# Patient Record
Sex: FEMALE | Race: WHITE | NOT HISPANIC OR LATINO | Employment: OTHER | URBAN - METROPOLITAN AREA
[De-identification: names, ages, dates, MRNs, and addresses within clinical notes are randomized per-mention and may not be internally consistent; named-entity substitution may affect disease eponyms.]

---

## 2023-08-31 PROBLEM — F33.1 MAJOR DEPRESSIVE DISORDER, RECURRENT EPISODE, MODERATE WITH ANXIOUS DISTRESS (MULTI): Status: ACTIVE | Noted: 2023-08-31

## 2023-08-31 PROBLEM — Z95.1 S/P CABG (CORONARY ARTERY BYPASS GRAFT): Status: ACTIVE | Noted: 2023-08-31

## 2023-08-31 PROBLEM — R09.89 CARDIOVASCULAR SYMPTOMS: Status: ACTIVE | Noted: 2023-08-31

## 2023-08-31 PROBLEM — L50.9 URTICARIA: Status: ACTIVE | Noted: 2023-08-31

## 2023-08-31 PROBLEM — N39.41 URGE INCONTINENCE OF URINE: Status: ACTIVE | Noted: 2023-08-31

## 2023-08-31 PROBLEM — R07.9 CHEST PAIN: Status: ACTIVE | Noted: 2023-08-31

## 2023-08-31 PROBLEM — K44.9 LARGE HIATAL HERNIA: Status: ACTIVE | Noted: 2023-08-31

## 2023-08-31 PROBLEM — I20.9 ANGINA PECTORIS (CMS-HCC): Status: ACTIVE | Noted: 2023-08-31

## 2023-08-31 PROBLEM — J45.991 COUGH VARIANT ASTHMA (HHS-HCC): Status: ACTIVE | Noted: 2023-08-31

## 2023-08-31 PROBLEM — N32.81 OVERACTIVE BLADDER: Status: ACTIVE | Noted: 2023-08-31

## 2023-08-31 PROBLEM — D22.4 MELANOCYTIC NEVI OF SCALP AND NECK: Status: ACTIVE | Noted: 2023-03-22

## 2023-08-31 PROBLEM — F41.8 DEPRESSION WITH ANXIETY: Status: ACTIVE | Noted: 2023-08-31

## 2023-08-31 PROBLEM — E78.5 HYPERLIPIDEMIA: Status: ACTIVE | Noted: 2023-08-31

## 2023-08-31 PROBLEM — R06.00 DYSPNEA: Status: ACTIVE | Noted: 2023-08-31

## 2023-08-31 PROBLEM — E66.811 CLASS 1 OBESITY: Status: ACTIVE | Noted: 2023-08-31

## 2023-08-31 PROBLEM — L90.5 SCAR CONDITION AND FIBROSIS OF SKIN: Status: ACTIVE | Noted: 2023-03-22

## 2023-08-31 PROBLEM — I73.9 CLAUDICATION OF CALF MUSCLES (CMS-HCC): Status: ACTIVE | Noted: 2023-08-31

## 2023-08-31 PROBLEM — M15.4 EROSIVE (OSTEO)ARTHRITIS: Status: ACTIVE | Noted: 2023-08-31

## 2023-08-31 PROBLEM — M54.16 LUMBAR RADICULITIS: Status: ACTIVE | Noted: 2023-08-31

## 2023-08-31 PROBLEM — H91.8X3 ASYMMETRICAL HEARING LOSS: Status: ACTIVE | Noted: 2023-08-31

## 2023-08-31 PROBLEM — M85.80 OSTEOPENIA: Status: ACTIVE | Noted: 2023-08-31

## 2023-08-31 PROBLEM — D48.5 NEOPLASM OF UNCERTAIN BEHAVIOR OF SKIN: Status: ACTIVE | Noted: 2023-03-22

## 2023-08-31 PROBLEM — D51.0 ANEMIA, PERNICIOUS: Status: ACTIVE | Noted: 2023-08-31

## 2023-08-31 PROBLEM — I25.10 CORONARY ARTERY DISEASE: Status: ACTIVE | Noted: 2023-08-31

## 2023-08-31 PROBLEM — R41.3 MEMORY DIFFICULTIES: Status: ACTIVE | Noted: 2023-08-31

## 2023-08-31 PROBLEM — M54.50 CHRONIC LOW BACK PAIN: Status: ACTIVE | Noted: 2023-08-31

## 2023-08-31 PROBLEM — Z95.1 S/P CABG (CORONARY ARTERY BYPASS GRAFT): Status: RESOLVED | Noted: 2023-08-31 | Resolved: 2023-08-31

## 2023-08-31 PROBLEM — I73.9 PAD (PERIPHERAL ARTERY DISEASE) (CMS-HCC): Status: ACTIVE | Noted: 2023-08-31

## 2023-08-31 PROBLEM — H93.13 BILATERAL TINNITUS: Status: ACTIVE | Noted: 2023-08-31

## 2023-08-31 PROBLEM — E66.9 CLASS 1 OBESITY: Status: ACTIVE | Noted: 2023-08-31

## 2023-08-31 PROBLEM — R73.01 IFG (IMPAIRED FASTING GLUCOSE): Status: ACTIVE | Noted: 2023-08-31

## 2023-08-31 PROBLEM — E55.9 VITAMIN D DEFICIENCY: Status: ACTIVE | Noted: 2023-08-31

## 2023-08-31 PROBLEM — G25.81 RESTLESS LEG SYNDROME: Status: ACTIVE | Noted: 2023-08-31

## 2023-08-31 PROBLEM — E04.2 MULTINODULAR THYROID: Status: ACTIVE | Noted: 2023-08-31

## 2023-08-31 PROBLEM — G47.33 OBSTRUCTIVE SLEEP APNEA SYNDROME: Status: ACTIVE | Noted: 2023-08-31

## 2023-08-31 PROBLEM — R79.89 D-DIMER, ELEVATED: Status: ACTIVE | Noted: 2023-08-31

## 2023-08-31 PROBLEM — M16.12 OSTEOARTHRITIS OF LEFT HIP: Status: ACTIVE | Noted: 2023-08-31

## 2023-08-31 PROBLEM — R53.81 PHYSICAL DECONDITIONING: Status: ACTIVE | Noted: 2023-08-31

## 2023-08-31 PROBLEM — J31.0 CHRONIC RHINITIS: Status: ACTIVE | Noted: 2023-08-31

## 2023-08-31 PROBLEM — R12 HEARTBURN: Status: ACTIVE | Noted: 2023-08-31

## 2023-08-31 PROBLEM — R53.83 FATIGUE DUE TO DEPRESSION: Status: ACTIVE | Noted: 2023-08-31

## 2023-08-31 PROBLEM — S82.409A FIBULA FRACTURE: Status: ACTIVE | Noted: 2023-08-31

## 2023-08-31 PROBLEM — E53.8 VITAMIN B 12 DEFICIENCY: Status: ACTIVE | Noted: 2023-08-31

## 2023-08-31 PROBLEM — F32.A FATIGUE DUE TO DEPRESSION: Status: ACTIVE | Noted: 2023-08-31

## 2023-08-31 PROBLEM — G47.00 INSOMNIA: Status: ACTIVE | Noted: 2023-08-31

## 2023-08-31 PROBLEM — N18.9 CKD (CHRONIC KIDNEY DISEASE): Status: ACTIVE | Noted: 2023-08-31

## 2023-08-31 PROBLEM — F41.9 ANXIETY: Status: ACTIVE | Noted: 2023-08-31

## 2023-08-31 PROBLEM — I51.89 DIASTOLIC DYSFUNCTION: Status: ACTIVE | Noted: 2023-08-31

## 2023-08-31 PROBLEM — K21.9 GERD (GASTROESOPHAGEAL REFLUX DISEASE): Status: ACTIVE | Noted: 2023-08-31

## 2023-08-31 PROBLEM — D18.01 HEMANGIOMA OF SKIN AND SUBCUTANEOUS TISSUE: Status: ACTIVE | Noted: 2023-03-22

## 2023-08-31 PROBLEM — R94.2 DIFFUSION CAPACITY OF LUNG (DL), DECREASED: Status: ACTIVE | Noted: 2023-08-31

## 2023-08-31 PROBLEM — G89.29 CHRONIC LOW BACK PAIN: Status: ACTIVE | Noted: 2023-08-31

## 2023-08-31 PROBLEM — E04.1 RIGHT THYROID NODULE: Status: ACTIVE | Noted: 2023-08-31

## 2023-08-31 PROBLEM — R39.15 URINARY URGENCY: Status: ACTIVE | Noted: 2023-08-31

## 2023-08-31 PROBLEM — L57.0 ACTINIC KERATOSIS: Status: ACTIVE | Noted: 2023-03-22

## 2023-08-31 PROBLEM — I10 HYPERTENSION: Status: ACTIVE | Noted: 2023-08-31

## 2023-08-31 PROBLEM — R00.2 HEART PALPITATIONS: Status: ACTIVE | Noted: 2023-08-31

## 2023-08-31 PROBLEM — J45.909 ASTHMA (HHS-HCC): Status: ACTIVE | Noted: 2023-08-31

## 2023-08-31 RX ORDER — NITROGLYCERIN 0.4 MG/1
0.4 TABLET SUBLINGUAL EVERY 5 MIN PRN
COMMUNITY
Start: 2020-08-17

## 2023-08-31 RX ORDER — ROSUVASTATIN CALCIUM 10 MG/1
1 TABLET, COATED ORAL DAILY
COMMUNITY
Start: 2021-03-31

## 2023-08-31 RX ORDER — FLUTICASONE FUROATE 100 UG/1
1 POWDER RESPIRATORY (INHALATION) DAILY
COMMUNITY
Start: 2023-02-08 | End: 2023-10-09 | Stop reason: ALTCHOICE

## 2023-08-31 RX ORDER — CHOLECALCIFEROL (VITAMIN D3) 125 MCG
125 CAPSULE ORAL
COMMUNITY
Start: 2022-05-24 | End: 2023-10-31

## 2023-08-31 RX ORDER — SOLIFENACIN SUCCINATE 10 MG/1
1 TABLET, FILM COATED ORAL DAILY
COMMUNITY
Start: 2022-03-09 | End: 2024-05-17 | Stop reason: SDUPTHER

## 2023-08-31 RX ORDER — MV/FA/DHA/EPA/FISH OIL/SAW/GNK 400MCG-200
COMBINATION PACKAGE (EA) ORAL
COMMUNITY
Start: 2022-11-22 | End: 2023-12-20 | Stop reason: WASHOUT

## 2023-08-31 RX ORDER — DEXAMETHASONE 4 MG/1
2 TABLET ORAL 2 TIMES DAILY
COMMUNITY
Start: 2023-04-19

## 2023-08-31 RX ORDER — KETOCONAZOLE 20 MG/G
CREAM TOPICAL
COMMUNITY
Start: 2022-01-26

## 2023-08-31 RX ORDER — ALBUTEROL SULFATE 90 UG/1
2 AEROSOL, METERED RESPIRATORY (INHALATION) EVERY 4 HOURS PRN
COMMUNITY
Start: 2023-02-08 | End: 2024-01-31 | Stop reason: WASHOUT

## 2023-08-31 RX ORDER — SERTRALINE HYDROCHLORIDE 50 MG/1
1 TABLET, FILM COATED ORAL DAILY
COMMUNITY
Start: 2019-07-05 | End: 2023-12-20 | Stop reason: SDUPTHER

## 2023-08-31 RX ORDER — AA/PROT/LYSINE/METHIO/VIT C/B6 50-12.5 MG
10 TABLET ORAL 2 TIMES DAILY
COMMUNITY
Start: 2017-07-05

## 2023-08-31 RX ORDER — DILTIAZEM HYDROCHLORIDE 120 MG/1
120 CAPSULE, EXTENDED RELEASE ORAL DAILY
COMMUNITY
Start: 2020-01-08 | End: 2024-01-22 | Stop reason: SDUPTHER

## 2023-08-31 RX ORDER — SPIRONOLACTONE 25 MG/1
1 TABLET ORAL DAILY
COMMUNITY
Start: 2022-05-24 | End: 2023-12-20 | Stop reason: WASHOUT

## 2023-08-31 RX ORDER — AZELASTINE 1 MG/ML
1 SPRAY, METERED NASAL 2 TIMES DAILY
COMMUNITY
Start: 2023-02-08

## 2023-08-31 RX ORDER — CETIRIZINE HYDROCHLORIDE 10 MG/1
10 TABLET, ORALLY DISINTEGRATING ORAL DAILY PRN
COMMUNITY
Start: 2019-05-17

## 2023-10-09 ENCOUNTER — OFFICE VISIT (OUTPATIENT)
Dept: PULMONOLOGY | Facility: CLINIC | Age: 78
End: 2023-10-09
Payer: MEDICARE

## 2023-10-09 VITALS
SYSTOLIC BLOOD PRESSURE: 131 MMHG | HEART RATE: 76 BPM | RESPIRATION RATE: 18 BRPM | DIASTOLIC BLOOD PRESSURE: 66 MMHG | BODY MASS INDEX: 34.21 KG/M2 | OXYGEN SATURATION: 94 % | HEIGHT: 61 IN | TEMPERATURE: 97.3 F | WEIGHT: 181.2 LBS

## 2023-10-09 DIAGNOSIS — R05.3 CHRONIC COUGH: ICD-10-CM

## 2023-10-09 DIAGNOSIS — J20.9 SUBACUTE BRONCHITIS: ICD-10-CM

## 2023-10-09 DIAGNOSIS — R06.00 DYSPNEA, UNSPECIFIED TYPE: ICD-10-CM

## 2023-10-09 DIAGNOSIS — J31.0 CHRONIC RHINITIS: Primary | ICD-10-CM

## 2023-10-09 PROBLEM — J45.991 COUGH VARIANT ASTHMA (HHS-HCC): Status: RESOLVED | Noted: 2023-08-31 | Resolved: 2023-10-09

## 2023-10-09 PROBLEM — J45.909 ASTHMA (HHS-HCC): Status: RESOLVED | Noted: 2023-08-31 | Resolved: 2023-10-09

## 2023-10-09 PROCEDURE — 3075F SYST BP GE 130 - 139MM HG: CPT | Performed by: STUDENT IN AN ORGANIZED HEALTH CARE EDUCATION/TRAINING PROGRAM

## 2023-10-09 PROCEDURE — 3078F DIAST BP <80 MM HG: CPT | Performed by: STUDENT IN AN ORGANIZED HEALTH CARE EDUCATION/TRAINING PROGRAM

## 2023-10-09 PROCEDURE — 1159F MED LIST DOCD IN RCRD: CPT | Performed by: STUDENT IN AN ORGANIZED HEALTH CARE EDUCATION/TRAINING PROGRAM

## 2023-10-09 PROCEDURE — 99214 OFFICE O/P EST MOD 30 MIN: CPT | Performed by: STUDENT IN AN ORGANIZED HEALTH CARE EDUCATION/TRAINING PROGRAM

## 2023-10-09 PROCEDURE — 1126F AMNT PAIN NOTED NONE PRSNT: CPT | Performed by: STUDENT IN AN ORGANIZED HEALTH CARE EDUCATION/TRAINING PROGRAM

## 2023-10-09 PROCEDURE — 1036F TOBACCO NON-USER: CPT | Performed by: STUDENT IN AN ORGANIZED HEALTH CARE EDUCATION/TRAINING PROGRAM

## 2023-10-09 NOTE — PROGRESS NOTES
Patient is here for a follow up visit    Interval HPI:  Has a lot of phlegm and is coughing  For about 2 months  Felt much better after the abx from her last visit.    Interval labs and radiology:      ROS: (POSITIVE ROS in UPPER CASE)  Constitutional symptoms: No unexplained weight loss, night sweats, fatigue/malaise/lethargy, loss of appetite, fever  Eyes: no visual changes   Ears, nose, mouth, and throat (ENT): No runny nose, nose bleeds,ear pain   Cardiovascular: No chest pain, loss of consciousness, claudication    Respiratory: No cough, sputum, wheeze, hemoptysis    Gastrointestinal: No abdominal pain, nausea/vomiting, diarrhea/constipation    Genitourinary: No dysuria, hematuria   Musculoskeletal: No stiffness, joint swelling   Neurological: No headache, numbness, limb weakness, poor balance    Endocrine: No polydipsia, polyuria   Hematologic: No purpura, petechia, prolonged or excessive bleeding      Vitals reviewed :see details in note  Vitals:    10/09/23 1344   BP: 131/66   Pulse: 76   Resp: 18   Temp: 36.3 °C (97.3 °F)   SpO2: 94%       Physical exam:  Constitutional: General appearance normal  Head: Normocephalic  Neck: neck supple, no lymphadenopathy  ENT: External inspection of ears and nose normal, moist mucus membranes, no thrush  Pulmonary: air entry bilaterally equal, no crackles or rhonchi  Cardiac: Normal S1 S2, no murmurs, rubs, gallops  GI: no abdominal distension, bowel sounds normal  Musculoskeletal: no swollen joints  Extremities: mild EDEMA bilaterally  Neuro: alert and oriented, no focal deficits, normal cognition  Skin: no erythema nodosum, skin lesions/swellings on extremities    Radiology:  --------------  CT Chest: 1/25/22  IMPRESSION:  1.  No evidence of acute pathology.  No evidence of significant  pulmonary disease. There is evidence of previous granulomatous  disease noted.  2. Coronary artery atherosclerosis and postoperative change from  CABG. Mitral annular calcification is  present. Heart size is normal.  3. Small hiatal hernia remains, decreased in size, patient appears to  be status post Nissen fundoplication. The majority of the hernia  currently is perigastric fat.        Assessment and Plan:  --------------------------    77-year-old  female, ex smoker, < 2 years, CAD s/p CABG (2013), ?asthma (h/o allergy), KUSH not on CPAP, here for evaluation of respiratory complains. Patient has been experiencing SOB intermittently over the past 2-3 years. Initially was found to have a large hiatal hernia which was subsequently repaired. However the SOB continued and is getting worse.     Patients symptoms are multifactorial, obesity, deconditioning, diastolic dysfunction and possible unclear ILD (coarse lower lung base crackles on exam). I suspect her untreated KUSH to be driving a major part of her symptoms, especially given her 30 pound wt gain and non specific symptoms.     1) Subacute bronchitis - ?airway hyperactivity, however methacholine is negative.  2) Diastolic dysfunction  3) Obesity  4) Deconditioning  5) Moderate to severe KUSH - chronic fatigue     Work up:  Echo : 12 Aug 21: Normal EF, impaired diastolic dysfunction, moderate mitral calcification, no TR.  11/18/21: PFT: FEV1 103%, %, DLCO 68%  10/13/21: Cardiac stress test: Stable infarcts along the mid to basillar lateral wall.  1/24/22: 6MWD - 357m (100%)  1/24/22: CT scan of the chest: Calcified granulomas, no pulmonary pathology  2/3/23: Methacholine challenge - negative test, but flow volume loops indicate some mild  2/23: PFT: fev1 - 100%, no BD response, %, DLCO 80%     Plan:  Use Flovent 110ugm, 2 puffs twice. Rinse and gargle after each use.  Albuterol hfa, technique demonstrated  azelastine nasal spray 1 sq each nostril twice daily  Steam inhalation and clear nostrils before bedtime  OTC zyrtec,claritin as needed        RTC in 6 months               Follow-up with a pulmonologist in 6 months.  Patient was made aware that I would not be seeing her in follow up, as my outpatient duties at [facility] was coming to an end. She was given recommendations on who She  could see next year.        Rolly Arnold MD  10/9/2023

## 2023-10-31 ENCOUNTER — OFFICE VISIT (OUTPATIENT)
Dept: PRIMARY CARE | Facility: CLINIC | Age: 78
End: 2023-10-31
Payer: MEDICARE

## 2023-10-31 VITALS
HEART RATE: 94 BPM | BODY MASS INDEX: 34.39 KG/M2 | WEIGHT: 182 LBS | DIASTOLIC BLOOD PRESSURE: 66 MMHG | SYSTOLIC BLOOD PRESSURE: 122 MMHG | OXYGEN SATURATION: 94 %

## 2023-10-31 DIAGNOSIS — Z91.81 AT RISK FOR FALLING: ICD-10-CM

## 2023-10-31 DIAGNOSIS — T14.8XXA CONTUSION OF SOFT TISSUE: Primary | ICD-10-CM

## 2023-10-31 DIAGNOSIS — R39.15 URINARY URGENCY: ICD-10-CM

## 2023-10-31 DIAGNOSIS — F33.1 MAJOR DEPRESSIVE DISORDER, RECURRENT EPISODE, MODERATE WITH ANXIOUS DISTRESS (MULTI): ICD-10-CM

## 2023-10-31 DIAGNOSIS — M21.961 DEFORMITY OF BOTH FEET: ICD-10-CM

## 2023-10-31 DIAGNOSIS — I20.9 ANGINA PECTORIS (CMS-HCC): ICD-10-CM

## 2023-10-31 DIAGNOSIS — M21.962 DEFORMITY OF BOTH FEET: ICD-10-CM

## 2023-10-31 DIAGNOSIS — R26.81 GAIT INSTABILITY: ICD-10-CM

## 2023-10-31 PROBLEM — I73.9 PAD (PERIPHERAL ARTERY DISEASE) (CMS-HCC): Status: RESOLVED | Noted: 2023-08-31 | Resolved: 2023-10-31

## 2023-10-31 PROBLEM — I73.9 CLAUDICATION OF CALF MUSCLES (CMS-HCC): Status: RESOLVED | Noted: 2023-08-31 | Resolved: 2023-10-31

## 2023-10-31 PROCEDURE — 99214 OFFICE O/P EST MOD 30 MIN: CPT | Performed by: INTERNAL MEDICINE

## 2023-10-31 PROCEDURE — 1159F MED LIST DOCD IN RCRD: CPT | Performed by: INTERNAL MEDICINE

## 2023-10-31 PROCEDURE — 3074F SYST BP LT 130 MM HG: CPT | Performed by: INTERNAL MEDICINE

## 2023-10-31 PROCEDURE — 1036F TOBACCO NON-USER: CPT | Performed by: INTERNAL MEDICINE

## 2023-10-31 PROCEDURE — 1160F RVW MEDS BY RX/DR IN RCRD: CPT | Performed by: INTERNAL MEDICINE

## 2023-10-31 PROCEDURE — 87186 SC STD MICRODIL/AGAR DIL: CPT

## 2023-10-31 PROCEDURE — 1126F AMNT PAIN NOTED NONE PRSNT: CPT | Performed by: INTERNAL MEDICINE

## 2023-10-31 PROCEDURE — 3078F DIAST BP <80 MM HG: CPT | Performed by: INTERNAL MEDICINE

## 2023-10-31 PROCEDURE — 87086 URINE CULTURE/COLONY COUNT: CPT

## 2023-10-31 RX ORDER — FOLIC ACID/MULTIVIT,IRON,MINER 0.4MG-18MG
800 TABLET ORAL
Qty: 180 TABLET | Refills: 3 | COMMUNITY
Start: 2023-10-31 | End: 2023-10-31

## 2023-10-31 RX ORDER — VIT C/E/ZN/COPPR/LUTEIN/ZEAXAN 250MG-90MG
25 CAPSULE ORAL DAILY
Qty: 30 CAPSULE | Refills: 11 | Status: SHIPPED | OUTPATIENT
Start: 2023-10-31 | End: 2024-10-30

## 2023-10-31 ASSESSMENT — ENCOUNTER SYMPTOMS
LOSS OF SENSATION IN FEET: 0
DEPRESSION: 0
OCCASIONAL FEELINGS OF UNSTEADINESS: 0

## 2023-10-31 NOTE — PATIENT INSTRUCTIONS

## 2023-10-31 NOTE — PROGRESS NOTES
"Subjective   Patient ID: Angie Schroeder is a 77 y.o. female who presents for Mass (Patient here for bump on buttocks and possibly uti).    HPI   C/O \"bump\" on left buttock area, developed after fall on 9/5, lost balance and landed on the buttock, striking an edge of the patio. Denies pain, concerned that the bump is not resolving.  C/O urinary urgency.  Review of Systems  Urinary urgency  Buttock bump  Objective   /66   Pulse 94   Wt 82.6 kg (182 lb)   SpO2 94%   BMI 34.39 kg/m²     Physical Exam  NAD. Cooperative.  Lungs CTA  Heart: RRR  Musculoskeletal: palpable oval nodule 8x11 cm, non tender, soft.    Assessment/Plan   Diagnoses and all orders for this visit:  Contusion of soft tissue  Major depressive disorder, recurrent episode, moderate with anxious distress (CMS/HCC)  Angina pectoris (CMS/HCC)  Urinary urgency  -     Urine Culture; Future  At risk for falling  -     Referral to Physical Therapy; Future  -     cholecalciferol (Vitamin D3) 25 MCG (1000 UT) capsule; Take 1 capsule (25 mcg) by mouth once daily.  Gait instability  -     Referral to Physical Therapy; Future  Deformity of both feet  -     Referral to Podiatry; Future  -     Referral to Podiatry; Future    Reassurance for soft tissue injury, anticipated gradual resolution.     Patient was identified as a fall risk. Risk prevention instructions provided.Patient was identified as a fall risk. Risk prevention instructions provided.  "

## 2023-11-01 ENCOUNTER — OFFICE VISIT (OUTPATIENT)
Dept: DERMATOLOGY | Facility: CLINIC | Age: 78
End: 2023-11-01
Payer: MEDICARE

## 2023-11-01 DIAGNOSIS — L82.1 SEBORRHEIC KERATOSES: ICD-10-CM

## 2023-11-01 DIAGNOSIS — L57.8 SUN-DAMAGED SKIN: ICD-10-CM

## 2023-11-01 DIAGNOSIS — D22.9 MULTIPLE BENIGN MELANOCYTIC NEVI: ICD-10-CM

## 2023-11-01 DIAGNOSIS — N39.0 URINARY TRACT INFECTION WITHOUT HEMATURIA, SITE UNSPECIFIED: Primary | ICD-10-CM

## 2023-11-01 DIAGNOSIS — Z87.2 HISTORY OF ACTINIC KERATOSES: ICD-10-CM

## 2023-11-01 DIAGNOSIS — L57.0 ACTINIC KERATOSIS: Primary | ICD-10-CM

## 2023-11-01 DIAGNOSIS — D18.01 CHERRY ANGIOMA: ICD-10-CM

## 2023-11-01 DIAGNOSIS — L82.0 SEBORRHEIC KERATOSIS, INFLAMED: ICD-10-CM

## 2023-11-01 PROCEDURE — 1160F RVW MEDS BY RX/DR IN RCRD: CPT | Performed by: DERMATOLOGY

## 2023-11-01 PROCEDURE — 3074F SYST BP LT 130 MM HG: CPT | Performed by: DERMATOLOGY

## 2023-11-01 PROCEDURE — 17000 DESTRUCT PREMALG LESION: CPT | Performed by: DERMATOLOGY

## 2023-11-01 PROCEDURE — 3078F DIAST BP <80 MM HG: CPT | Performed by: DERMATOLOGY

## 2023-11-01 PROCEDURE — 1126F AMNT PAIN NOTED NONE PRSNT: CPT | Performed by: DERMATOLOGY

## 2023-11-01 PROCEDURE — 1036F TOBACCO NON-USER: CPT | Performed by: DERMATOLOGY

## 2023-11-01 PROCEDURE — 99213 OFFICE O/P EST LOW 20 MIN: CPT | Performed by: DERMATOLOGY

## 2023-11-01 PROCEDURE — 1159F MED LIST DOCD IN RCRD: CPT | Performed by: DERMATOLOGY

## 2023-11-01 RX ORDER — NITROFURANTOIN 25; 75 MG/1; MG/1
100 CAPSULE ORAL 2 TIMES DAILY
Qty: 10 CAPSULE | Refills: 0 | Status: SHIPPED | OUTPATIENT
Start: 2023-11-01 | End: 2023-11-02

## 2023-11-01 NOTE — PROGRESS NOTES
Subjective     Angie Schroeder is a 77 y.o. female who presents for the following: Skin Check (6 month follow up).     Review of Systems:  No other skin or systemic complaints other than what is documented elsewhere in the note.    The following portions of the chart were reviewed this encounter and updated as appropriate:       Specialty Problems          Dermatology Problems    Actinic keratosis    Hemangioma of skin and subcutaneous tissue    Melanocytic nevi of scalp and neck    Neoplasm of uncertain behavior of skin    Scar condition and fibrosis of skin    Urticaria     Past Medical History:  Angie Schroeder  has a past medical history of Anxiety disorder, unspecified (08/15/2022), Non-ST elevation (NSTEMI) myocardial infarction (CMS/HCC), Other conditions influencing health status, Personal history of other diseases of the musculoskeletal system and connective tissue (2014), Personal history of other diseases of the nervous system and sense organs, and Personal history of other endocrine, nutritional and metabolic disease.    Past Surgical History:  Angie Schroeder  has a past surgical history that includes Coronary artery bypass graft (2013);  section, classic (2013); Other surgical history (2013); Cataract extraction (2017); Breast lumpectomy (2013); Sinus surgery (2013); Other surgical history (2021); Other surgical history (2019); and CT angio aorta and bilateral iliofemoral runoff w and or wo IV contrast (2022).    Family History:  Patient family history includes Allergies in her mother; Colon cancer in her paternal grandmother; Coronary artery disease in her brother, father, and mother; Crohn's disease in her brother; Heart attack in her brother, father, and mother; Lymphoma in her mother; cardiac disorder in her brother, father, and mother; food allergy in her mother.    Social History:  Angie Schroeder  reports that she has  quit smoking. Her smoking use included cigarettes. She has never used smokeless tobacco. She reports current alcohol use of about 2.0 standard drinks of alcohol per week. No history on file for drug use.    Allergies:  Amoxicillin-pot clavulanate, Beta-blockers (beta-adrenergic blocking agts), Furosemide, Metoprolol, Procaine, and Sulfa (sulfonamide antibiotics)    Current Medications / CAM's:    Current Outpatient Medications:     albuterol 90 mcg/actuation inhaler, Inhale 2 puffs every 4 hours if needed., Disp: , Rfl:     aspirin 81 mg capsule, Take 1 tablet by mouth once daily., Disp: , Rfl:     azelastine (Astelin) 137 mcg (0.1 %) nasal spray, Administer 1 spray into affected nostril(s) 2 times a day., Disp: , Rfl:     cetirizine (ZyrTEC) 10 mg tablet,disintegrating, Take 10 mg by mouth once daily as needed., Disp: , Rfl:     cholecalciferol (Vitamin D3) 25 MCG (1000 UT) capsule, Take 1 capsule (25 mcg) by mouth once daily., Disp: 30 capsule, Rfl: 11    coenzyme Q-10 10 mg capsule, Take 1 capsule (10 mg) by mouth 2 times a day., Disp: , Rfl:     DILT- mg 24 hr capsule, Take 1 capsule (120 mg) by mouth once daily., Disp: , Rfl:     Flovent  mcg/actuation inhaler, Inhale 2 puffs 2 times a day., Disp: , Rfl:     ketoconazole (NIZOral) 2 % cream, Apply topically., Disp: , Rfl:     krill oil 500 mg capsule, Take by mouth., Disp: , Rfl:     nitroglycerin (Nitrostat) 0.4 mg SL tablet, Place 1 tablet (0.4 mg) under the tongue every 5 minutes if needed., Disp: , Rfl:     rosuvastatin (Crestor) 10 mg tablet, Take 1 tablet (10 mg) by mouth once daily., Disp: , Rfl:     sertraline (Zoloft) 50 mg tablet, Take 1 tablet (50 mg) by mouth once daily., Disp: , Rfl:     solifenacin (VESIcare) 10 mg tablet, Take 1 tablet (10 mg) by mouth once daily., Disp: , Rfl:     spironolactone (Aldactone) 25 mg tablet, Take 1 tablet (25 mg) by mouth once daily., Disp: , Rfl:      Objective   Well appearing patient in no apparent  distress; mood and affect are within normal limits.    A full examination was performed including scalp, head, eyes, ears, nose, lips, neck, chest, axillae, abdomen, back, buttocks, bilateral upper extremities, bilateral lower extremities, hands, feet, fingers, toes, fingernails and toenail exam was limited due to patient having polish in place. All findings within normal limits unless otherwise noted below.    Generalized, Mid Back, Right Elbow - Posterior  - Scattered waxy tan/grey/brown papules with horn cysts    - scattered small bright red papules and macules    - scattered regular brown macules and papules    - scattered tan macules, telangiectasias, and general photo-damage    Right Temple  Erythematous macules with gritty scale.         Assessment/Plan   Actinic keratosis  Right Temple    Actinic keratosis   - Caused by sun damage and can turn into skin cancer if left untreated. There are many treatment options.   - At this time I recommend treatment with liquid nitrogen cryotherapy in clinic today. The lesions will blister over the next week and then fall off over the following 1-2 weeks. No additional treatment is necessary but Vaseline ointment and/or a bandage can be applied if the area is bothersome.   - The patient expressed understanding, is in agreement with this plan, and wishes to proceed with liquid nitrogen cryotherapy.     Destr of lesion - Right Temple  Complexity: simple    Destruction method: cryotherapy    Informed consent: discussed and consent obtained    Lesion destroyed using liquid nitrogen: Yes    Cryotherapy cycles:  1  Outcome: patient tolerated procedure well with no complications    Post-procedure details: wound care instructions given      Seborrheic keratosis, inflamed    Related Procedures  Follow Up In Dermatology - Established Patient  Follow Up In Dermatology - Established Patient    Seborrheic keratoses (3)  Right Elbow - Posterior; Mid Back; Generalized    Seborrheic  keratosis (-es)  - Discussed benign nature and that no treatment is necessary unless it becomes painful or increases in size. Patient opts for clinical monitoring at this time except (1) on right elbow and (2) on mid back on bra line that were removed with cryotherapy as a courtesy today.  - Patient will return for next available 30 minute cosmetic appointment to have >15 removed with cryotherapy.  - Patient is aware of the cosmetic cost associated with visit.     Berger angioma    Cherry angioma(s)  - Discussed benign nature and that no treatment is necessary unless it becomes painful or increases in size. Patient opts for clinical monitoring at this time.      Multiple benign melanocytic nevi    Benign melanocytic nevi  - Discussed benign nature and that no treatment is necessary unless it becomes painful or increases in size. Patient opts for clinical monitoring at this time.    - Sun protective behavior reviewed and encouraged including the use of over-the-counter sunscreen with SPF30+ daily (reapply every 1.5 hours when outdoors), UPF clothing, broad rimmed hats, sunglasses, and avoidance of midday sun. Home skin monitoring encouraged and how to monitor for skin cancer (changing or new moles, new rapidly growing or non-healing lesions) reviewed. Patient encouraged to call with interval concerns or changes.      Sun-damaged skin    Actinically damaged skin-  - Sun protective behavior reviewed and encouraged including the use of over-the-counter sunscreen with SPF30+ daily (reapply every 1.5 hours when outdoors), UPF clothing, broad rimmed hats, sunglasses, and avoidance of midday sun. Home skin monitoring encouraged and how to monitor for skin cancer (changing or new moles, new rapidly growing or non-healing lesions) reviewed. Patient encouraged to call with interval concerns or changes.      History of actinic keratoses         Maryellen Diaz MD- [place]

## 2023-11-02 DIAGNOSIS — N39.0 ACUTE UTI: Primary | ICD-10-CM

## 2023-11-02 LAB
BACTERIA UR CULT: ABNORMAL
BACTERIA UR CULT: ABNORMAL

## 2023-11-02 RX ORDER — CIPROFLOXACIN 500 MG/1
500 TABLET ORAL 2 TIMES DAILY
Qty: 10 TABLET | Refills: 0 | Status: SHIPPED | OUTPATIENT
Start: 2023-11-02 | End: 2023-11-07

## 2023-12-10 RX ORDER — AZITHROMYCIN 500 MG/1
500 TABLET, FILM COATED ORAL DAILY
COMMUNITY
Start: 2023-04-06 | End: 2023-12-20 | Stop reason: WASHOUT

## 2023-12-10 RX ORDER — PREDNISONE 20 MG/1
40 TABLET ORAL DAILY
COMMUNITY
Start: 2023-04-06 | End: 2023-12-20 | Stop reason: WASHOUT

## 2023-12-20 ENCOUNTER — TELEMEDICINE (OUTPATIENT)
Dept: BEHAVIORAL HEALTH | Facility: CLINIC | Age: 78
End: 2023-12-20
Payer: MEDICARE

## 2023-12-20 DIAGNOSIS — F41.9 ANXIETY: ICD-10-CM

## 2023-12-20 DIAGNOSIS — F32.A DEPRESSION, UNSPECIFIED DEPRESSION TYPE: ICD-10-CM

## 2023-12-20 PROCEDURE — 99212 OFFICE O/P EST SF 10 MIN: CPT | Performed by: CLINICAL NURSE SPECIALIST

## 2023-12-20 RX ORDER — SERTRALINE HYDROCHLORIDE 50 MG/1
50 TABLET, FILM COATED ORAL DAILY
Qty: 90 TABLET | Refills: 1 | Status: SHIPPED | OUTPATIENT
Start: 2023-12-20 | End: 2024-05-17 | Stop reason: SDUPTHER

## 2023-12-20 NOTE — PROGRESS NOTES
Outpatient Psychiatry      Subjective   Angie Schroeder, a 77 y.o. female,presents as an established patient for follow up for a routine appointment for virtual appointment for medication management /outpatient psychiatry     Diagnosis:   Depression, unspecified depression type   Insomnia   Anxiety       Patient Active Problem List   Diagnosis    Anemia, pernicious    Angina pectoris (CMS/HCC)    Chest pain    Anxiety    Bilateral tinnitus    Cardiovascular symptoms    Chronic low back pain    Chronic rhinitis    CKD (chronic kidney disease)    Class 1 obesity    Coronary artery disease    D-dimer, elevated    Depression with anxiety    Diastolic dysfunction    Diffusion capacity of lung (dl), decreased    Erosive (osteo)arthritis    Actinic keratosis    Fibula fracture    Fatigue due to depression    GERD (gastroesophageal reflux disease)    Large hiatal hernia    Heart palpitations    Heartburn    Hemangioma of skin and subcutaneous tissue    Hyperlipidemia    Hypertension    IFG (impaired fasting glucose)    Insomnia    Lumbar radiculitis    Major depressive disorder, recurrent episode, moderate with anxious distress (CMS/HCC)    Melanocytic nevi of scalp and neck    Memory difficulties    Multinodular thyroid    Neoplasm of uncertain behavior of skin    Obstructive sleep apnea syndrome    Osteoarthritis of left hip    Osteopenia    Overactive bladder    Physical deconditioning    Restless leg syndrome    Right thyroid nodule    Scar condition and fibrosis of skin    Dyspnea    Urge incontinence of urine    Urinary urgency    Urticaria    Vitamin B 12 deficiency    Vitamin D deficiency    Asymmetrical hearing loss    Subacute bronchitis    Chronic cough       Treatment Goals:  Specify outcomes written in observable, behavioral terms:   Maintain stability of mental health and adhere to treatment     Treatment Plan/Recommendations:   can continue self care and wellness efforts and maintain routine health  screenings , can call  for treatment concerns , and scheduling concerns.  can follow up in 5 -6 months   Follow-up plan was discussed with patient.      Review with patient: Treatment plan reviewed with the patient.  Medication risks/benefit reviewed with the patient    HPI:  mood overall stable   anxiety and worrying are manageable according to patient   sees medical providers regularly , has had ongoing medical appointments , reviewed notes in the [facility] emr for appointments with other medical providers  coping skills appear strong   can attend to daily activities   reconciled medication list in the [facility] em[facility] and provided psychoeducation   she says she deals with some back pain from arthritis , and sciatica and scoliosis , and modifies activities   she does not have any worse concerns with memory   She had a fall in [facility] , she is not sure why , no injury , she was carrying a trash bag outside    she says she has friends she enjoys spending time with         Review of Systems     Depressive Symptoms: not depressed or irritable,~no loss of interest,~no change in appetite,~no recent lb weight gain,~no recent lb weight loss,~no insomnia,~no fatigue or loss of energy,~not feeling worthless or guilty,~normal concentration,~ability to make decisions,~no suicidal ideation,~no guns or weapons in household.   Manic Symptoms: mood is not irritable or elevated,~self esteem is not grandiose or increased,~no changes in need for sleep,~not more talkative than usual,~does not have flight of ideas or racing thoughts,~no distractibility,~no psychomotor agitation or increased goal-directed activity,~no excessive involvement in pleasurable activities.   Psychotic Symptoms: no hallucinations,~no delusions,~no disorganized speech,~does not have disorganized behavior or catatonia,~no negative symptoms.   Anxiety Symptoms: no difficulty controlling worry,~no increased arousal, ~no panic attacks,~no concerns about future  panic attacks,~no worry about panic attack consequences,~no change in behavior due to panic attacks,~no excessive worry,~not easily fatigued due to worry,~no difficulty concentrating due to worry,~no irritability due to worry,~no muscle tension due to worry,~no sleep disturbances due to worry,~no specific phobia,~no social phobia,~no obsessions,~no compulsions,~no exposure to traumatic event,~no re-experiencing of traumatic event,~no avoidance of stimuli and number of responsiveness,~no restlessness / feeling on edge due to worry.   Delirium/ Altered Mental Status Symptoms: no disturbances of consciousness,~no diminished ability to focus, sustain, shift attention,~no change in cognition or perceptual disturbances,~symptoms do not fluctuate during the course of the day,~general medical condition is not present.   Disordered Eating Symptoms: weight is not less than 85% of ideal body weight,~no intense fear of gaining weight,~does not have a poor body image,~no restricting of diet and/or excessive exercise,~no purging or laxative use.   Post-traumatic stress disorder symptoms The patient is currently asymptomatic.   Inattentive Symptoms: is often forgetful, but~does not make careless mistakes often,~does not have difficulty paying attention,~not often disorganized,~does not lose things often,~is not easily distracted,~does not avoid/dislike tasks with sustained mental effort,~listens when spoken to directly,~is able to follow instructions and finish schoolwork.   Conduct Issues: no aggression towards people or animals,~no destruction of property,~no deceitfulness,~does not violate rules.   Other Symptoms/ Concerns: no symptoms of separation anxiety,~no reactive attachment symptoms,~no motor tics,~no vocal tics,~no stuttering,~no phonological problems,~no loss of urine control,~no encopresis,~no intellectual disability,~no self-injurious behaviors,~not somatic and no conversion symptoms,~no gender identity symptoms,~no  sleep disorder symptoms,~no impulse control symptoms,~no personality disorder symptoms.       Constitutional: sleep apnea, but~as noted in HPI,~normal sleeping,~no night wakings,~no snoring,~not a picky eater,~normal appetite,~no swallowing problems,~no night terrors,~no nightmares,~no restless sleep,~no snorts/gasps~and~no obesity.   Eyes: no vision test,~no vision impairment,~does not wear glasses/contacts,~does not wear glassess/contacts~and~no blindness.   ENT: no hearing tested,~no hearing loss,~no hearing aid,~no cochlear implant,~no excessive drooling,~no dental problems~and~no recurrent strep throat.   Cardiovascular: no murmur,~no heart defect,~no chest pain,~no palpitations~and~no syncope.   Respiratory: no wheezing~and~no asthma/reactive airway disease.   Gastrointestinal: no constipation,~no abdominal pain,~no nausea,~no vomiting,~no diarrhea,~no blood in stools,~no g-tube~and~no reflux.   Genitourinary: no nocturnal enuresis,~no diurnal enuresis~and~no incontinence.   Musculoskeletal: normal gait~and~no torticollis, but~moving all extremities well and symmetrical,~no arthritis or joint problems,~no myalgias,~no muscle weakness~and~normal hand preference.   Integumentary: no changes in moles or birthmarks,~no rashes~and~no atopic dermatitis.   Neurological: no symmetrical facies,~no headache,~no head injury,~no seizures,~no staring spells,~no loss of consciousness,~no meningitis/encephalitis,~no cerebral palsy,~no spina bifida,~no stereotypy,~no developmental regression~and~no tics or twitches.   Endocrine: no temperature intolerance,~,~good growth~and~no failure to thrive.   Hematologic/Lymphatic: no anemia~and~no lead poisoning.       Current Medications:Depression, unspecified depression type    · Renew: Sertraline HCl - 50 MG Oral Tablet; 1 tablet daily      Current Outpatient Medications:     azithromycin (Zithromax) 500 mg tablet, Take 1 tablet (500 mg) by mouth once daily., Disp: , Rfl:      predniSONE (Deltasone) 20 mg tablet, Take 2 tablets (40 mg) by mouth once daily., Disp: , Rfl:     albuterol 108 (90 Base) MCG/ACT inhaler, Inhale., Disp: , Rfl:     albuterol 90 mcg/actuation inhaler, Inhale 2 puffs every 4 hours if needed., Disp: , Rfl:     aspirin 81 mg capsule, Take 1 tablet by mouth once daily., Disp: , Rfl:     azelastine (Astelin) 137 mcg (0.1 %) nasal spray, Administer 1 spray into affected nostril(s) 2 times a day., Disp: , Rfl:     cetirizine (ZyrTEC) 10 mg tablet,disintegrating, Take 10 mg by mouth once daily as needed., Disp: , Rfl:     cholecalciferol (Vitamin D3) 25 MCG (1000 UT) capsule, Take 1 capsule (25 mcg) by mouth once daily., Disp: 30 capsule, Rfl: 11    coenzyme Q-10 10 mg capsule, Take 1 capsule (10 mg) by mouth 2 times a day., Disp: , Rfl:     DILT- mg 24 hr capsule, Take 1 capsule (120 mg) by mouth once daily., Disp: , Rfl:     Flovent  mcg/actuation inhaler, Inhale 2 puffs 2 times a day., Disp: , Rfl:     ketoconazole (NIZOral) 2 % cream, Apply topically., Disp: , Rfl:     krill oil 500 mg capsule, Take by mouth., Disp: , Rfl:     nitroglycerin (Nitrostat) 0.4 mg SL tablet, Place 1 tablet (0.4 mg) under the tongue every 5 minutes if needed., Disp: , Rfl:     rosuvastatin (Crestor) 10 mg tablet, Take 1 tablet (10 mg) by mouth once daily., Disp: , Rfl:     sertraline (Zoloft) 50 mg tablet, Take 1 tablet (50 mg) by mouth once daily., Disp: , Rfl:     solifenacin (VESIcare) 10 mg tablet, Take 1 tablet (10 mg) by mouth once daily., Disp: , Rfl:     spironolactone (Aldactone) 25 mg tablet, Take 1 tablet (25 mg) by mouth once daily., Disp: , Rfl:   Medical History:  Past Medical History:   Diagnosis Date    Anxiety disorder, unspecified 08/15/2022    Anxiety    Non-ST elevation (NSTEMI) myocardial infarction (CMS/HCC)     Non-ST elevation myocardial infarction (NSTEMI)    Other conditions influencing health status     Coronary Artery Disease    Personal history of  other diseases of the musculoskeletal system and connective tissue 2014    Personal history of arthritis    Personal history of other diseases of the nervous system and sense organs     History of sleep apnea    Personal history of other endocrine, nutritional and metabolic disease     History of hyperlipidemia     Surgical History:  Past Surgical History:   Procedure Laterality Date    BREAST LUMPECTOMY  2013    Breast Surgery Lumpectomy    CATARACT EXTRACTION  2017    Cataract Surgery     SECTION, CLASSIC  2013     Section    CORONARY ARTERY BYPASS GRAFT  2013    CABG    CT AORTA AND BILATERAL ILIOFEMORAL RUNOFF ANGIOGRAM W AND/OR WO IV CONTRAST  2022    CT AORTA AND BILATERAL ILIOFEMORAL RUNOFF ANGIOGRAM W AND/OR WO IV CONTRAST 2022 [facility]    OTHER SURGICAL HISTORY  2013    Surgery Of The Eyelids    OTHER SURGICAL HISTORY  2021    Paraesophageal hiatal hernia repair    OTHER SURGICAL HISTORY  2019    Complete colonoscopy    SINUS SURGERY  2013    Sinus Surgery     Family History:  Family History   Problem Relation Name Age of Onset    Heart attack Mother      Other (food allergy) Mother      Coronary artery disease Mother      Other (cardiac disorder) Mother      Allergies Mother      Lymphoma Mother      Heart attack Father      Coronary artery disease Father      Other (cardiac disorder) Father      Heart attack Brother      Coronary artery disease Brother      Crohn's disease Brother      Other (cardiac disorder) Brother      Colon cancer Paternal Grandmother       Social History:  Social History     Socioeconomic History    Marital status:      Spouse name: Not on file    Number of children: Not on file    Years of education: Not on file    Highest education level: Not on file   Occupational History    Not on file   Tobacco Use    Smoking status: Former     Types: Cigarettes    Smokeless tobacco: Never    Vaping Use    Vaping Use: Never used   Substance and Sexual Activity    Alcohol use: Yes     Alcohol/week: 2.0 standard drinks of alcohol     Types: 1 Glasses of wine, 1 Shots of liquor per week    Drug use: Not on file    Sexual activity: Not on file   Other Topics Concern    Not on file   Social History Narrative    Not on file     Social Determinants of Health     Financial Resource Strain: Not on file   Food Insecurity: Not on file   Transportation Needs: Not on file   Physical Activity: Not on file   Stress: Not on file   Social Connections: Not on file   Intimate Partner Violence: Not on file   Housing Stability: Not on file     Record Review: brief     Vitals:  There were no vitals filed for this visit.    Rossy Pandya, APRN-CNS

## 2024-01-22 DIAGNOSIS — R00.2 HEART PALPITATIONS: ICD-10-CM

## 2024-01-22 DIAGNOSIS — I10 HYPERTENSION, UNSPECIFIED TYPE: ICD-10-CM

## 2024-01-22 DIAGNOSIS — I51.89 DIASTOLIC DYSFUNCTION: ICD-10-CM

## 2024-01-22 RX ORDER — DILTIAZEM HYDROCHLORIDE 120 MG/1
120 CAPSULE, EXTENDED RELEASE ORAL DAILY
Qty: 90 CAPSULE | Refills: 3 | Status: SHIPPED | OUTPATIENT
Start: 2024-01-22

## 2024-01-22 NOTE — TELEPHONE ENCOUNTER
I left a message for pt to call [phone] requesting more information about the refill she is requesting from Dr SCOT Rose

## 2024-01-22 NOTE — TELEPHONE ENCOUNTER
Pt called back and left VM, needs refill for Diltiazem. Please sign pended med renewal if appropriate. Thank you.

## 2024-01-25 ENCOUNTER — TELEPHONE (OUTPATIENT)
Dept: PULMONOLOGY | Facility: HOSPITAL | Age: 79
End: 2024-01-25
Payer: MEDICARE

## 2024-01-31 ENCOUNTER — OFFICE VISIT (OUTPATIENT)
Dept: DERMATOLOGY | Facility: CLINIC | Age: 79
End: 2024-01-31
Payer: MEDICARE

## 2024-01-31 DIAGNOSIS — D18.01 CHERRY ANGIOMA: ICD-10-CM

## 2024-01-31 DIAGNOSIS — D48.5 NEOPLASM OF UNCERTAIN BEHAVIOR OF SKIN: ICD-10-CM

## 2024-01-31 DIAGNOSIS — L82.1 SEBORRHEIC KERATOSES: ICD-10-CM

## 2024-01-31 DIAGNOSIS — D22.9 MULTIPLE BENIGN MELANOCYTIC NEVI: ICD-10-CM

## 2024-01-31 DIAGNOSIS — L82.0 SEBORRHEIC KERATOSIS, INFLAMED: ICD-10-CM

## 2024-01-31 DIAGNOSIS — Z12.83 SCREENING EXAM FOR SKIN CANCER: Primary | ICD-10-CM

## 2024-01-31 DIAGNOSIS — L57.0 ACTINIC KERATOSIS: ICD-10-CM

## 2024-01-31 DIAGNOSIS — L57.8 SUN-DAMAGED SKIN: ICD-10-CM

## 2024-01-31 DIAGNOSIS — L81.4 LENTIGO: ICD-10-CM

## 2024-01-31 DIAGNOSIS — L82.0 INFLAMED SEBORRHEIC KERATOSIS: ICD-10-CM

## 2024-01-31 PROCEDURE — 17110 DESTRUCTION B9 LES UP TO 14: CPT

## 2024-01-31 PROCEDURE — 99214 OFFICE O/P EST MOD 30 MIN: CPT | Performed by: DERMATOLOGY

## 2024-01-31 PROCEDURE — 1036F TOBACCO NON-USER: CPT | Performed by: DERMATOLOGY

## 2024-01-31 PROCEDURE — 1159F MED LIST DOCD IN RCRD: CPT | Performed by: DERMATOLOGY

## 2024-01-31 PROCEDURE — 1126F AMNT PAIN NOTED NONE PRSNT: CPT | Performed by: DERMATOLOGY

## 2024-01-31 PROCEDURE — 17000 DESTRUCT PREMALG LESION: CPT

## 2024-01-31 PROCEDURE — 11301 SHAVE SKIN LESION 0.6-1.0 CM: CPT | Performed by: DERMATOLOGY

## 2024-01-31 PROCEDURE — 88305 TISSUE EXAM BY PATHOLOGIST: CPT | Performed by: DERMATOLOGY

## 2024-01-31 ASSESSMENT — DERMATOLOGY PATIENT ASSESSMENT
DO YOU HAVE IRREGULAR MENSTRUAL CYCLES: NO
HAVE YOU HAD OR DO YOU HAVE VASCULAR DISEASE: NO
DO YOU USE SUNSCREEN: DAILY
DO YOU USE A TANNING BED: NO
ARE YOU ON BIRTH CONTROL: NO
ARE YOU TRYING TO GET PREGNANT: NO
HAVE YOU HAD OR DO YOU HAVE A STAPH INFECTION: NO
DO YOU HAVE ANY NEW OR CHANGING LESIONS: NO
ARE YOU AN ORGAN TRANSPLANT RECIPIENT: NO

## 2024-01-31 ASSESSMENT — DERMATOLOGY QUALITY OF LIFE (QOL) ASSESSMENT
RATE HOW EMOTIONALLY BOTHERED YOU ARE BY YOUR SKIN PROBLEM (FOR EXAMPLE, WORRY, EMBARRASSMENT, FRUSTRATION): 3
ARE THERE EXCLUSIONS OR EXCEPTIONS FOR THE QUALITY OF LIFE ASSESSMENT: NO
RATE HOW BOTHERED YOU ARE BY EFFECTS OF YOUR SKIN PROBLEMS ON YOUR ACTIVITIES (EG, GOING OUT, ACCOMPLISHING WHAT YOU WANT, WORK ACTIVITIES OR YOUR RELATIONSHIPS WITH OTHERS): 1
WHAT SINGLE SKIN CONDITION LISTED BELOW IS THE PATIENT ANSWERING THE QUALITY-OF-LIFE ASSESSMENT QUESTIONS ABOUT: ACTINIC KERATOSIS
DATE THE QUALITY-OF-LIFE ASSESSMENT WAS COMPLETED: 66870
RATE HOW BOTHERED YOU ARE BY SYMPTOMS OF YOUR SKIN PROBLEM (EG, ITCHING, STINGING BURNING, HURTING OR SKIN IRRITATION): 3

## 2024-01-31 ASSESSMENT — PATIENT GLOBAL ASSESSMENT (PGA): PATIENT GLOBAL ASSESSMENT: PATIENT GLOBAL ASSESSMENT:  3 - MODERATE

## 2024-01-31 ASSESSMENT — ITCH NUMERIC RATING SCALE: HOW SEVERE IS YOUR ITCHING?: 8

## 2024-01-31 NOTE — PROGRESS NOTES
Subjective     Angie Schroeder is a 78 y.o. female who presents for the following: Skin Check (Yearly).  One lesion of concern on the medial left knee that has been present for months. She reports it gets annoying and sometimes gets stuck on her clothes.    Review of Systems:  No other skin or systemic complaints other than what is documented elsewhere in the note.    The following portions of the chart were reviewed this encounter and updated as appropriate:       Specialty Problems          Dermatology Problems    Actinic keratosis    Hemangioma of skin and subcutaneous tissue    Melanocytic nevi of scalp and neck    Neoplasm of uncertain behavior of skin    Scar condition and fibrosis of skin    Urticaria     Past Medical History:  Angie Schroeder  has a past medical history of Anxiety disorder, unspecified (08/15/2022), Non-ST elevation (NSTEMI) myocardial infarction (CMS/HCC), Other conditions influencing health status, Personal history of other diseases of the musculoskeletal system and connective tissue (2014), Personal history of other diseases of the nervous system and sense organs, and Personal history of other endocrine, nutritional and metabolic disease.    Past Surgical History:  Angie Schroeder  has a past surgical history that includes Coronary artery bypass graft (2013);  section, classic (2013); Other surgical history (2013); Cataract extraction (2017); Breast lumpectomy (2013); Sinus surgery (2013); Other surgical history (2021); Other surgical history (2019); and CT angio aorta and bilateral iliofemoral runoff w and or wo IV contrast (2022).    Family History:  Patient family history includes Allergies in her mother; Colon cancer in her paternal grandmother; Coronary artery disease in her brother, father, and mother; Crohn's disease in her brother; Heart attack in her brother, father, and mother; Lymphoma in her mother;  cardiac disorder in her brother, father, and mother; food allergy in her mother.    Social History:  Angie Schroeder  reports that she has quit smoking. Her smoking use included cigarettes. She has never used smokeless tobacco. She reports current alcohol use of about 2.0 standard drinks of alcohol per week. No history on file for drug use.    Allergies:  Amoxicillin-pot clavulanate, Beta-blockers (beta-adrenergic blocking agts), Furosemide, Metoprolol, Procaine, Proparacaine, and Sulfa (sulfonamide antibiotics)    Current Medications / CAM's:    Current Outpatient Medications:     albuterol 108 (90 Base) MCG/ACT inhaler, Inhale., Disp: , Rfl:     albuterol 90 mcg/actuation inhaler, Inhale 2 puffs every 4 hours if needed., Disp: , Rfl:     aspirin 81 mg capsule, Take 1 tablet by mouth once daily., Disp: , Rfl:     azelastine (Astelin) 137 mcg (0.1 %) nasal spray, Administer 1 spray into affected nostril(s) 2 times a day., Disp: , Rfl:     cetirizine (ZyrTEC) 10 mg tablet,disintegrating, Take 10 mg by mouth once daily as needed., Disp: , Rfl:     cholecalciferol (Vitamin D3) 25 MCG (1000 UT) capsule, Take 1 capsule (25 mcg) by mouth once daily., Disp: 30 capsule, Rfl: 11    coenzyme Q-10 10 mg capsule, Take 1 capsule (10 mg) by mouth 2 times a day., Disp: , Rfl:     DILT- mg 24 hr capsule, Take 1 capsule (120 mg) by mouth once daily., Disp: 90 capsule, Rfl: 3    Flovent  mcg/actuation inhaler, Inhale 2 puffs 2 times a day., Disp: , Rfl:     ketoconazole (NIZOral) 2 % cream, Apply topically., Disp: , Rfl:     nitroglycerin (Nitrostat) 0.4 mg SL tablet, Place 1 tablet (0.4 mg) under the tongue every 5 minutes if needed., Disp: , Rfl:     rosuvastatin (Crestor) 10 mg tablet, Take 1 tablet (10 mg) by mouth once daily., Disp: , Rfl:     sertraline (Zoloft) 50 mg tablet, Take 1 tablet (50 mg) by mouth once daily., Disp: 90 tablet, Rfl: 1    solifenacin (VESIcare) 10 mg tablet, Take 1 tablet (10 mg) by mouth  once daily., Disp: , Rfl:      Objective   Well appearing patient in no apparent distress; mood and affect are within normal limits.    A full examination was performed including scalp, head, eyes, ears, nose, lips, neck, chest, axillae, abdomen, back, buttocks, bilateral upper extremities, bilateral lower extremities, hands, feet, fingers, toes, fingernails, and toenails. All findings within normal limits unless otherwise noted below.    Scattered tan macules in sun-exposed areas.    - Scattered waxy tan/grey/brown papules with horn cysts    - scattered small bright red papules and macules    - scattered tan macules, telangiectasias, and general photo-damage    - scattered regular brown macules and papules    Medial left knee  6mm tan colored papule with significant hyperkeratotic horn          Left Malar Cheek  Erythematous macules with gritty scale.    Mid Back  Large brown waxy stuck on papule with erythematous rim    Examination obscured by opaque nail polish on all 10 fingernails.    Assessment/Plan   Screening exam for skin cancer    Related Procedures  Follow Up In Dermatology - Established Patient    Neoplasm of uncertain behavior of skin  Medial left knee    Shave removal    Informed consent: discussed and consent obtained    Timeout: patient name, date of birth, surgical site, and procedure verified    Procedure prep:  Patient was prepped and draped  Anesthesia: the lesion was anesthetized in a standard fashion    Anesthetic:  1% lidocaine w/ epinephrine 1-100,000 local infiltration  Instrument used: DermaBlade    Hemostasis achieved with: aluminum chloride    Outcome: patient tolerated procedure well    Post-procedure details: sterile dressing applied and wound care instructions given    Dressing type: bandage and petrolatum      Staff Communication: Dermatology Local Anesthesia: 1 % Lidocaine / Epinephrine - Amount: 3ccs    Specimen 1 - Dermatopathology- DERM LAB  Differential Diagnosis: ISK vs AK    Check Margins Yes/No?:  yes  Comments:    Dermpath Lab: Routine Histopathology (formalin-fixed tissue)    Actinic keratosis  Left Malar Cheek    Destr of lesion - Left Malar Cheek  Complexity: simple    Destruction method: cryotherapy    Informed consent: discussed and consent obtained    Lesion destroyed using liquid nitrogen: Yes    Cryotherapy cycles:  1  Outcome: patient tolerated procedure well with no complications    Post-procedure details: wound care instructions given      Inflamed seborrheic keratosis  Mid Back    - Patient adamant regarding the irritating nature of this lesion    Destr of lesion - Mid Back    Destruction method: cryotherapy    Lesion destroyed using liquid nitrogen: Yes    Outcome: patient tolerated procedure well with no complications    Post-procedure details: wound care instructions given      Seborrheic keratosis, inflamed    Related Procedures  Follow Up In Dermatology - Established Patient    Lentigo    - Benign finding; no intervention indicated today.      Seborrheic keratoses    Seborrheic keratosis (-es)  - Discussed benign nature and that no treatment is necessary unless it becomes painful or increases in size. Patient opts for clinical monitoring at this time.      Cherry angioma    Cherry angioma(s)  - Discussed benign nature and that no treatment is necessary unless it becomes painful or increases in size. Patient opts for clinical monitoring at this time.      Sun-damaged skin    Actinically damaged skin  - Sun protective behavior reviewed and encouraged including the use of over-the-counter sunscreen with SPF30+ daily (reapply every 1.5 hours when outdoors), UPF clothing, broad rimmed hats, sunglasses, and avoidance of midday sun. Home skin monitoring encouraged and how to monitor for skin cancer (changing or new moles, new rapidly growing or non-healing lesions) reviewed. Patient encouraged to call with interval concerns or changes.      Multiple benign melanocytic  nevi    Benign melanocytic nevi  - Discussed benign nature and that no treatment is necessary unless it becomes painful or increases in size. Patient opts for clinical monitoring at this time.    - Sun protective behavior reviewed and encouraged including the use of over-the-counter sunscreen with SPF30+ daily (reapply every 1.5 hours when outdoors), UPF clothing, broad rimmed hats, sunglasses, and avoidance of midday sun. Home skin monitoring encouraged and how to monitor for skin cancer (changing or new moles, new rapidly growing or non-healing lesions) reviewed. Patient encouraged to call with interval concerns or changes.         Return to clinic in 1 year for full body skin exam.    Loraine Del Toro MD, FLORENTIN  PGY-2, Department of Dermatology    I saw and evaluated the patient, participating in the key elements of the service.  I discussed the findings, assessment and plan with the resident and agree with resident’s findings and plan as documented in the resident's note.  I was immediately available for the entirety of the procedure(s) and present for the key and critical portions.     Maryellen Diaz MD

## 2024-01-31 NOTE — PATIENT INSTRUCTIONS
Thank you for visiting with  Dermatology today!    At your visit today, you had a full body skin exam. You had some irritated seborrheic keratoses treated with liquid nitrogen, and one was removed and sent as a biopsy. We will call you with the results in 1-2 weeks.    Going forward, we suggest the following:  - For moisturizer, you can try [employer] Age Renew Crepe Corrector Body Lotion  - For biopsy site, keep the current band aid in place for 24 hours     - After 24 hours, remove the band aid and wash with gentle soap and water     - Replace vaseline and a band aid over the site     - Repeat the cycle of washing and covering with vaseline/band aid daily until the site heals

## 2024-02-02 LAB
LABORATORY COMMENT REPORT: NORMAL
PATH REPORT.FINAL DX SPEC: NORMAL
PATH REPORT.GROSS SPEC: NORMAL
PATH REPORT.RELEVANT HX SPEC: NORMAL
PATH REPORT.TOTAL CANCER: NORMAL

## 2024-02-26 ENCOUNTER — APPOINTMENT (OUTPATIENT)
Dept: PODIATRY | Facility: CLINIC | Age: 79
End: 2024-02-26
Payer: MEDICARE

## 2024-05-02 ENCOUNTER — APPOINTMENT (OUTPATIENT)
Dept: PULMONOLOGY | Facility: CLINIC | Age: 79
End: 2024-05-02
Payer: MEDICARE

## 2024-05-17 ENCOUNTER — DOCUMENTATION (OUTPATIENT)
Dept: BEHAVIORAL HEALTH | Facility: CLINIC | Age: 79
End: 2024-05-17
Payer: MEDICARE

## 2024-05-17 DIAGNOSIS — F41.9 ANXIETY: ICD-10-CM

## 2024-05-17 DIAGNOSIS — F32.A DEPRESSION, UNSPECIFIED DEPRESSION TYPE: ICD-10-CM

## 2024-05-17 DIAGNOSIS — N32.81 OVERACTIVE BLADDER: ICD-10-CM

## 2024-05-17 RX ORDER — SERTRALINE HYDROCHLORIDE 50 MG/1
50 TABLET, FILM COATED ORAL DAILY
Qty: 90 TABLET | Refills: 0 | Status: SHIPPED | OUTPATIENT
Start: 2024-05-17 | End: 2024-08-15

## 2024-05-17 RX ORDER — SOLIFENACIN SUCCINATE 10 MG/1
10 TABLET, FILM COATED ORAL DAILY
Qty: 90 TABLET | Refills: 0 | Status: SHIPPED | OUTPATIENT
Start: 2024-05-17

## 2024-05-17 NOTE — PROGRESS NOTES
Nonbillable note : reviewed [facility] , patient is scheduled for June , sent over script for sertraline to her pharmacy per request for refill , kpacer cns

## 2024-05-22 ENCOUNTER — TELEPHONE (OUTPATIENT)
Dept: BEHAVIORAL HEALTH | Facility: CLINIC | Age: 79
End: 2024-05-22
Payer: MEDICARE

## 2024-06-20 ENCOUNTER — APPOINTMENT (OUTPATIENT)
Dept: BEHAVIORAL HEALTH | Facility: CLINIC | Age: 79
End: 2024-06-20
Payer: MEDICARE

## 2024-08-05 ENCOUNTER — OFFICE VISIT (OUTPATIENT)
Dept: CARDIOLOGY | Facility: CLINIC | Age: 79
End: 2024-08-05
Payer: MEDICARE

## 2024-08-05 VITALS
DIASTOLIC BLOOD PRESSURE: 66 MMHG | BODY MASS INDEX: 34.95 KG/M2 | SYSTOLIC BLOOD PRESSURE: 124 MMHG | HEIGHT: 61 IN | HEART RATE: 77 BPM | OXYGEN SATURATION: 91 % | WEIGHT: 185.13 LBS

## 2024-08-05 DIAGNOSIS — I25.118 CORONARY ARTERY DISEASE OF NATIVE HEART WITH STABLE ANGINA PECTORIS, UNSPECIFIED VESSEL OR LESION TYPE (CMS-HCC): Primary | ICD-10-CM

## 2024-08-05 PROCEDURE — 99214 OFFICE O/P EST MOD 30 MIN: CPT | Performed by: INTERNAL MEDICINE

## 2024-08-05 PROCEDURE — 3074F SYST BP LT 130 MM HG: CPT | Performed by: INTERNAL MEDICINE

## 2024-08-05 PROCEDURE — 1036F TOBACCO NON-USER: CPT | Performed by: INTERNAL MEDICINE

## 2024-08-05 PROCEDURE — 93005 ELECTROCARDIOGRAM TRACING: CPT | Performed by: INTERNAL MEDICINE

## 2024-08-05 PROCEDURE — 93010 ELECTROCARDIOGRAM REPORT: CPT | Performed by: INTERNAL MEDICINE

## 2024-08-05 PROCEDURE — 1126F AMNT PAIN NOTED NONE PRSNT: CPT | Performed by: INTERNAL MEDICINE

## 2024-08-05 PROCEDURE — 3078F DIAST BP <80 MM HG: CPT | Performed by: INTERNAL MEDICINE

## 2024-08-05 PROCEDURE — 1159F MED LIST DOCD IN RCRD: CPT | Performed by: INTERNAL MEDICINE

## 2024-08-05 RX ORDER — ROSUVASTATIN CALCIUM 10 MG/1
10 TABLET, COATED ORAL DAILY
Qty: 90 TABLET | Refills: 3 | Status: SHIPPED | OUTPATIENT
Start: 2024-08-05 | End: 2025-08-05

## 2024-08-05 ASSESSMENT — PATIENT HEALTH QUESTIONNAIRE - PHQ9
2. FEELING DOWN, DEPRESSED OR HOPELESS: NOT AT ALL
1. LITTLE INTEREST OR PLEASURE IN DOING THINGS: NOT AT ALL
SUM OF ALL RESPONSES TO PHQ9 QUESTIONS 1 AND 2: 0

## 2024-08-05 ASSESSMENT — ENCOUNTER SYMPTOMS
DEPRESSION: 0
MYALGIAS: 0
CONSTIPATION: 0
HEADACHES: 0
NAUSEA: 0
LOSS OF SENSATION IN FEET: 0
DYSURIA: 0
HEMATURIA: 0
DIARRHEA: 0
OCCASIONAL FEELINGS OF UNSTEADINESS: 1
FEVER: 0
VOMITING: 0
ALTERED MENTAL STATUS: 0
BLOATING: 0
CHILLS: 0
ABDOMINAL PAIN: 0
COUGH: 0
MEMORY LOSS: 0
HEMOPTYSIS: 0
WHEEZING: 0
FALLS: 0

## 2024-08-05 ASSESSMENT — COLUMBIA-SUICIDE SEVERITY RATING SCALE - C-SSRS
2. HAVE YOU ACTUALLY HAD ANY THOUGHTS OF KILLING YOURSELF?: NO
1. IN THE PAST MONTH, HAVE YOU WISHED YOU WERE DEAD OR WISHED YOU COULD GO TO SLEEP AND NOT WAKE UP?: NO
6. HAVE YOU EVER DONE ANYTHING, STARTED TO DO ANYTHING, OR PREPARED TO DO ANYTHING TO END YOUR LIFE?: NO

## 2024-08-05 ASSESSMENT — PAIN SCALES - GENERAL: PAINLEVEL: 0-NO PAIN

## 2024-08-05 NOTE — PROGRESS NOTES
Chief Complaint   Patient presents with    Follow-up    Coronary Artery Disease    Hyperlipidemia       HPI  77 yo WF w/ h/o CAD s/p CABG 1/13 (LIMA->LAD, SVG->OM, SVG->RCA), PAD, HTN, HLD, parox SVT, KUSH (intol CPAP) now here for cardiology f/u. She has long h/o occ jaw/chest pain/pressure only in bed that resolved immed with Tums, no assoc symptoms. No other CP including during the day or with activity.   No dyspnea at rest. +ch mild CROW. No orthopnea/PND. No palps. No LH/dizziness/syncope. +occ mild LE edema, less on Krill oil. No further claudication. No cough. +occ fatigue  ECG 8/20: SR (80), nonsp T-wave changes, ?IMI  ECG 7/21: SR (76), nonsp T-wave changes,  IMI.  ECG 5/22: SR (82),  IMI  HM 10/19: SR, HR  (avg 80), VT x4 (long 20b), SVT x23 (long 9sec)  Echo 8/21: EF 60-65%, DD, mod MAC  Echo 6/22: EF 65-70%, sep shud  Nuc 10/19: no ischemia, inflat scar, EF >65%  Nuc 9/21: no ischemia, inflat scar, EF >65%  CTA chest 8/20: no PE, large HH  CT chest 1/22: mod cor calc, nl heart size, no peric eff, mild athero of Ao, no aneurysm, nl PA  PFT 11/21: no obst, sub red DLCO  LE US 7/20: no DVT  MAMTA 6/22: R PT 1.15 DP 1.8, L PT 1.02 DP 1.37  CTA periph 6/22: dif par calc athero dz, B SFA mild, B tib mild-mod, RA no stenosis    Review of Systems   Constitutional: Negative for chills, fever and malaise/fatigue.   HENT:  Negative for hearing loss.    Eyes:  Negative for visual disturbance.   Respiratory:  Negative for cough, hemoptysis and wheezing.    Skin:  Negative for rash.   Musculoskeletal:  Negative for falls and myalgias.   Gastrointestinal:  Negative for bloating, abdominal pain, constipation, diarrhea, dysphagia, nausea and vomiting.   Genitourinary:  Negative for dysuria and hematuria.   Neurological:  Negative for headaches.   Psychiatric/Behavioral:  Negative for altered mental status, depression and memory loss.       Social History     Tobacco Use    Smoking status: Former     Types:  Cigarettes    Smokeless tobacco: Never   Substance Use Topics    Alcohol use: Yes     Alcohol/week: 2.0 standard drinks of alcohol     Types: 1 Glasses of wine, 1 Shots of liquor per week      Family History   Problem Relation Name Age of Onset    Heart attack Mother      Other (food allergy) Mother      Coronary artery disease Mother      Other (cardiac disorder) Mother      Allergies Mother      Lymphoma Mother      Heart attack Father      Coronary artery disease Father      Other (cardiac disorder) Father      Heart attack Brother      Coronary artery disease Brother      Crohn's disease Brother      Other (cardiac disorder) Brother      Colon cancer Paternal Grandmother        Allergies   Allergen Reactions    Amoxicillin-Pot Clavulanate Unknown    Beta-Blockers (Beta-Adrenergic Blocking Agts) Unknown    Furosemide Hives    Metoprolol Other    Procaine Other    Proparacaine Unknown    Sulfa (Sulfonamide Antibiotics) Hives      Current Outpatient Medications   Medication Instructions    albuterol 108 (90 Base) MCG/ACT inhaler inhalation    aspirin 81 mg capsule 1 tablet, oral, Daily    azelastine (Astelin) 137 mcg (0.1 %) nasal spray 1 spray, nasal, 2 times daily    cholecalciferol (VITAMIN D3) 25 mcg, oral, Daily    coenzyme Q-10 10 mg, oral, 2 times daily    DILT- mg, oral, Daily    Flovent  mcg/actuation inhaler 2 puffs, inhalation, 2 times daily    ketoconazole (NIZOral) 2 % cream Topical    nitroglycerin (NITROSTAT) 0.4 mg, sublingual, Every 5 min PRN    rosuvastatin (Crestor) 10 mg tablet 1 tablet, oral, Daily    sertraline (ZOLOFT) 50 mg, oral, Daily    sertraline (ZOLOFT) 50 mg, oral, Daily    solifenacin (VESICARE) 10 mg, oral, Daily    ZyrTEC 10 mg, oral, Daily PRN      Vitals:    08/05/24 1306   BP: 124/66   Pulse: 77   SpO2: 91%      Physical Exam  Constitutional:       Appearance: Normal appearance.   HENT:      Head: Normocephalic and atraumatic.      Nose: Nose normal.   Neck:       Vascular: No carotid bruit.   Cardiovascular:      Rate and Rhythm: Normal rate and regular rhythm.      Heart sounds: No murmur heard.  Pulmonary:      Effort: Pulmonary effort is normal.      Breath sounds: Normal breath sounds.   Abdominal:      Palpations: Abdomen is soft.      Tenderness: There is no abdominal tenderness.   Musculoskeletal:      Right lower leg: No edema.      Left lower leg: No edema.   Skin:     General: Skin is warm and dry.   Neurological:      General: No focal deficit present.      Mental Status: She is alert.   Psychiatric:         Mood and Affect: Mood normal.         Judgment: Judgment normal.        Assessment/Plan   77 yo WF w/ h/o CAD s/p CABG 1/13 (LIMA->LAD, SVG->OM, SVG->RCA), PAD, HTN, HLD, parox SVT, KUSH (intol CPAP) now w/ long h/o atypical CP most c/w GERD as evidence by resolution with antacid. Nuc 9/21 with no ischemia. If CP more typical (lucho if doesn't resolve w/ antacid), can get updated stress test.  -continue ASA 81 qd (with food)  -continue Diltiazem 120 qd (depression on BB)  -continue Rosuva 10 qd -> goal LDL <70  -f/u 1 year (earlier if needed)     Kishore Rose MD

## 2024-08-06 LAB
ATRIAL RATE: 79 BPM
P AXIS: 31 DEGREES
P OFFSET: 199 MS
P ONSET: 146 MS
PR INTERVAL: 136 MS
Q ONSET: 214 MS
QRS COUNT: 13 BEATS
QRS DURATION: 82 MS
QT INTERVAL: 400 MS
QTC CALCULATION(BAZETT): 458 MS
QTC FREDERICIA: 438 MS
R AXIS: 10 DEGREES
T AXIS: 103 DEGREES
T OFFSET: 414 MS
VENTRICULAR RATE: 79 BPM

## 2024-09-01 DIAGNOSIS — Z91.81 AT RISK FOR FALLING: ICD-10-CM

## 2024-09-04 RX ORDER — VIT C/E/ZN/COPPR/LUTEIN/ZEAXAN 250MG-90MG
25 CAPSULE ORAL DAILY
Qty: 90 CAPSULE | Refills: 0 | Status: SHIPPED | OUTPATIENT
Start: 2024-09-04

## 2024-09-23 ENCOUNTER — LAB (OUTPATIENT)
Dept: LAB | Facility: LAB | Age: 79
End: 2024-09-23
Payer: MEDICARE

## 2024-09-23 ENCOUNTER — APPOINTMENT (OUTPATIENT)
Dept: PRIMARY CARE | Facility: CLINIC | Age: 79
End: 2024-09-23
Payer: MEDICARE

## 2024-09-23 VITALS
SYSTOLIC BLOOD PRESSURE: 119 MMHG | OXYGEN SATURATION: 97 % | DIASTOLIC BLOOD PRESSURE: 66 MMHG | HEIGHT: 61 IN | HEART RATE: 82 BPM | BODY MASS INDEX: 34.93 KG/M2 | WEIGHT: 185 LBS

## 2024-09-23 DIAGNOSIS — Z11.59 NEED FOR HEPATITIS C SCREENING TEST: ICD-10-CM

## 2024-09-23 DIAGNOSIS — E78.5 HYPERLIPIDEMIA, UNSPECIFIED HYPERLIPIDEMIA TYPE: ICD-10-CM

## 2024-09-23 DIAGNOSIS — R71.0 HEMOGLOBIN DROP: ICD-10-CM

## 2024-09-23 DIAGNOSIS — R09.82 POSTNASAL DRIP: ICD-10-CM

## 2024-09-23 DIAGNOSIS — F33.1 MAJOR DEPRESSIVE DISORDER, RECURRENT EPISODE, MODERATE WITH ANXIOUS DISTRESS (MULTI): ICD-10-CM

## 2024-09-23 DIAGNOSIS — Z00.00 MEDICARE ANNUAL WELLNESS VISIT, SUBSEQUENT: Primary | ICD-10-CM

## 2024-09-23 DIAGNOSIS — R41.3 MEMORY CHANGE: ICD-10-CM

## 2024-09-23 DIAGNOSIS — Z12.31 ENCOUNTER FOR SCREENING MAMMOGRAM FOR MALIGNANT NEOPLASM OF BREAST: ICD-10-CM

## 2024-09-23 DIAGNOSIS — G25.81 RLS (RESTLESS LEGS SYNDROME): ICD-10-CM

## 2024-09-23 DIAGNOSIS — R73.01 IFG (IMPAIRED FASTING GLUCOSE): ICD-10-CM

## 2024-09-23 DIAGNOSIS — R10.9 ABDOMINAL PRESSURE: ICD-10-CM

## 2024-09-23 DIAGNOSIS — H93.13 TINNITUS OF BOTH EARS: ICD-10-CM

## 2024-09-23 DIAGNOSIS — Z00.00 MEDICARE ANNUAL WELLNESS VISIT, SUBSEQUENT: ICD-10-CM

## 2024-09-23 DIAGNOSIS — G89.29 CHRONIC LOW BACK PAIN WITHOUT SCIATICA, UNSPECIFIED BACK PAIN LATERALITY: ICD-10-CM

## 2024-09-23 DIAGNOSIS — N18.31 STAGE 3A CHRONIC KIDNEY DISEASE (MULTI): ICD-10-CM

## 2024-09-23 DIAGNOSIS — E66.9 OBESITY (BMI 30.0-34.9): ICD-10-CM

## 2024-09-23 DIAGNOSIS — R60.9 DEPENDENT EDEMA: ICD-10-CM

## 2024-09-23 DIAGNOSIS — Z23 IMMUNIZATION DUE: ICD-10-CM

## 2024-09-23 DIAGNOSIS — M54.50 CHRONIC LOW BACK PAIN WITHOUT SCIATICA, UNSPECIFIED BACK PAIN LATERALITY: ICD-10-CM

## 2024-09-23 DIAGNOSIS — H91.93 BILATERAL HEARING LOSS, UNSPECIFIED HEARING LOSS TYPE: ICD-10-CM

## 2024-09-23 DIAGNOSIS — Z78.0 ASYMPTOMATIC MENOPAUSE: ICD-10-CM

## 2024-09-23 DIAGNOSIS — Z00.00 ROUTINE GENERAL MEDICAL EXAMINATION AT HEALTH CARE FACILITY: ICD-10-CM

## 2024-09-23 LAB
ALBUMIN SERPL BCP-MCNC: 4.3 G/DL (ref 3.4–5)
ALP SERPL-CCNC: 89 U/L (ref 33–136)
ALT SERPL W P-5'-P-CCNC: 11 U/L (ref 7–45)
ANION GAP SERPL CALC-SCNC: 15 MMOL/L (ref 10–20)
AST SERPL W P-5'-P-CCNC: 17 U/L (ref 9–39)
BILIRUB SERPL-MCNC: 0.5 MG/DL (ref 0–1.2)
BUN SERPL-MCNC: 18 MG/DL (ref 6–23)
CALCIUM SERPL-MCNC: 9.5 MG/DL (ref 8.6–10.6)
CHLORIDE SERPL-SCNC: 105 MMOL/L (ref 98–107)
CHOLEST SERPL-MCNC: 169 MG/DL (ref 0–199)
CHOLESTEROL/HDL RATIO: 2.9
CO2 SERPL-SCNC: 26 MMOL/L (ref 21–32)
CREAT SERPL-MCNC: 0.93 MG/DL (ref 0.5–1.05)
CREAT UR-MCNC: 337.1 MG/DL (ref 20–320)
EGFRCR SERPLBLD CKD-EPI 2021: 63 ML/MIN/1.73M*2
ERYTHROCYTE [DISTWIDTH] IN BLOOD BY AUTOMATED COUNT: 13.4 % (ref 11.5–14.5)
EST. AVERAGE GLUCOSE BLD GHB EST-MCNC: 105 MG/DL
FERRITIN SERPL-MCNC: 112 NG/ML (ref 8–150)
GLUCOSE SERPL-MCNC: 104 MG/DL (ref 74–99)
HBA1C MFR BLD: 5.3 %
HCT VFR BLD AUTO: 45.9 % (ref 36–46)
HCV AB SER QL: NONREACTIVE
HDLC SERPL-MCNC: 59 MG/DL
HGB BLD-MCNC: 14.4 G/DL (ref 12–16)
IRON SATN MFR SERPL: 20 % (ref 25–45)
IRON SERPL-MCNC: 74 UG/DL (ref 35–150)
LDLC SERPL CALC-MCNC: 93 MG/DL
MCH RBC QN AUTO: 30.4 PG (ref 26–34)
MCHC RBC AUTO-ENTMCNC: 31.4 G/DL (ref 32–36)
MCV RBC AUTO: 97 FL (ref 80–100)
MICROALBUMIN UR-MCNC: 45 MG/L
MICROALBUMIN/CREAT UR: 13.3 UG/MG CREAT
NON HDL CHOLESTEROL: 110 MG/DL (ref 0–149)
NRBC BLD-RTO: 0 /100 WBCS (ref 0–0)
PLATELET # BLD AUTO: 158 X10*3/UL (ref 150–450)
POTASSIUM SERPL-SCNC: 4.1 MMOL/L (ref 3.5–5.3)
PROT SERPL-MCNC: 7.2 G/DL (ref 6.4–8.2)
RBC # BLD AUTO: 4.73 X10*6/UL (ref 4–5.2)
SODIUM SERPL-SCNC: 142 MMOL/L (ref 136–145)
TIBC SERPL-MCNC: 363 UG/DL (ref 240–445)
TRANSFERRIN SERPL-MCNC: 274 MG/DL (ref 200–360)
TRIGL SERPL-MCNC: 83 MG/DL (ref 0–149)
TSH SERPL-ACNC: 4.99 MIU/L (ref 0.44–3.98)
UIBC SERPL-MCNC: 289 UG/DL (ref 110–370)
VLDL: 17 MG/DL (ref 0–40)
WBC # BLD AUTO: 5.3 X10*3/UL (ref 4.4–11.3)

## 2024-09-23 PROCEDURE — 82043 UR ALBUMIN QUANTITATIVE: CPT

## 2024-09-23 PROCEDURE — 1123F ACP DISCUSS/DSCN MKR DOCD: CPT | Performed by: INTERNAL MEDICINE

## 2024-09-23 PROCEDURE — 1158F ADVNC CARE PLAN TLK DOCD: CPT | Performed by: INTERNAL MEDICINE

## 2024-09-23 PROCEDURE — 83036 HEMOGLOBIN GLYCOSYLATED A1C: CPT

## 2024-09-23 PROCEDURE — G0008 ADMIN INFLUENZA VIRUS VAC: HCPCS | Performed by: INTERNAL MEDICINE

## 2024-09-23 PROCEDURE — 82570 ASSAY OF URINE CREATININE: CPT

## 2024-09-23 PROCEDURE — 80053 COMPREHEN METABOLIC PANEL: CPT

## 2024-09-23 PROCEDURE — 84443 ASSAY THYROID STIM HORMONE: CPT

## 2024-09-23 PROCEDURE — 83550 IRON BINDING TEST: CPT

## 2024-09-23 PROCEDURE — 1160F RVW MEDS BY RX/DR IN RCRD: CPT | Performed by: INTERNAL MEDICINE

## 2024-09-23 PROCEDURE — 36415 COLL VENOUS BLD VENIPUNCTURE: CPT

## 2024-09-23 PROCEDURE — 3078F DIAST BP <80 MM HG: CPT | Performed by: INTERNAL MEDICINE

## 2024-09-23 PROCEDURE — 1170F FXNL STATUS ASSESSED: CPT | Performed by: INTERNAL MEDICINE

## 2024-09-23 PROCEDURE — 99214 OFFICE O/P EST MOD 30 MIN: CPT | Performed by: INTERNAL MEDICINE

## 2024-09-23 PROCEDURE — 3074F SYST BP LT 130 MM HG: CPT | Performed by: INTERNAL MEDICINE

## 2024-09-23 PROCEDURE — 90662 IIV NO PRSV INCREASED AG IM: CPT | Performed by: INTERNAL MEDICINE

## 2024-09-23 PROCEDURE — 1036F TOBACCO NON-USER: CPT | Performed by: INTERNAL MEDICINE

## 2024-09-23 PROCEDURE — 84466 ASSAY OF TRANSFERRIN: CPT

## 2024-09-23 PROCEDURE — 1159F MED LIST DOCD IN RCRD: CPT | Performed by: INTERNAL MEDICINE

## 2024-09-23 PROCEDURE — 80061 LIPID PANEL: CPT

## 2024-09-23 PROCEDURE — 83090 ASSAY OF HOMOCYSTEINE: CPT

## 2024-09-23 PROCEDURE — 85027 COMPLETE CBC AUTOMATED: CPT

## 2024-09-23 PROCEDURE — 86803 HEPATITIS C AB TEST: CPT

## 2024-09-23 PROCEDURE — 82728 ASSAY OF FERRITIN: CPT

## 2024-09-23 PROCEDURE — G0439 PPPS, SUBSEQ VISIT: HCPCS | Performed by: INTERNAL MEDICINE

## 2024-09-23 PROCEDURE — 83921 ORGANIC ACID SINGLE QUANT: CPT

## 2024-09-23 PROCEDURE — 83540 ASSAY OF IRON: CPT

## 2024-09-23 RX ORDER — PRAMIPEXOLE DIHYDROCHLORIDE 0.5 MG/1
0.5 TABLET ORAL NIGHTLY
Qty: 30 TABLET | Refills: 2 | Status: SHIPPED | OUTPATIENT
Start: 2024-09-23

## 2024-09-23 ASSESSMENT — ACTIVITIES OF DAILY LIVING (ADL)
DOING_HOUSEWORK: INDEPENDENT
MANAGING_FINANCES: INDEPENDENT
BATHING: INDEPENDENT
TAKING_MEDICATION: INDEPENDENT
GROCERY_SHOPPING: INDEPENDENT
DRESSING: INDEPENDENT

## 2024-09-23 ASSESSMENT — PATIENT HEALTH QUESTIONNAIRE - PHQ9
2. FEELING DOWN, DEPRESSED OR HOPELESS: NOT AT ALL
SUM OF ALL RESPONSES TO PHQ9 QUESTIONS 1 AND 2: 0
1. LITTLE INTEREST OR PLEASURE IN DOING THINGS: NOT AT ALL

## 2024-09-23 NOTE — PROGRESS NOTES
"Subjective   Reason for Visit: Angie Schroeder is an 78 y.o. female here for a Medicare Wellness visit.     Past Medical, Surgical, and Family History reviewed and updated in chart.    Reviewed all medications by prescribing practitioner or clinical pharmacist (such as prescriptions, OTCs, herbal therapies and supplements) and documented in the medical record.    HPI  The patient presents for an annual wellness exam with multiple concerns.  C/O RLS, inquiring about treatment options.  C/O low back pain, chronic.  C/O feet and ankle swelling, worse as the day progresses.  C/O mucus production from the sinuses.  C/O hearing loss and tinnitus.  C/O intermittent, infrequent, LLQ abdominal pressure, 6 months duration, unchanged, day time only.    Patient Care Team:  Francesca Tobias MD as PCP - General (Internal Medicine)  Francesca Tobias MD as PCP - [facility]  [facility] as Nurse Practitioner (Geriatric Psychiatry)     Review of Systems  Hearing loss, tinnitus  Postnasal drip  Leg swelling  LLQ abdominal pressure   Restless legs  Low back pain    Objective   Vitals:  /66   Pulse 82   Ht 1.549 m (5' 1\")   Wt 83.9 kg (185 lb)   SpO2 97%   BMI 34.96 kg/m²       Physical Exam  NAD. Cooperative.  HEENT: WNL  Neck: WNL  Lungs CTA  Heart: RRR  Abdomen: WNL  Musculoskeletal system: WNL  Neurologic exam: WNL    Assessment & Plan  Medicare annual wellness visit, subsequent    Orders:    Comprehensive metabolic panel; Future    Need for hepatitis C screening test    Orders:    Hepatitis C Antibody; Future    IFG (impaired fasting glucose)    Orders:    Hemoglobin A1C; Future    Asymptomatic menopause    Orders:    XR DEXA bone density; Future    Encounter for screening mammogram for malignant neoplasm of breast    Orders:    BI mammo bilateral screening tomosynthesis; Future    Stage 3a chronic kidney disease (Multi)    Orders:    Albumin-Creatinine Ratio, Urine Random; " Future    Immunization due    Orders:    Flu vaccine, trivalent, preservative free, HIGH-DOSE, age 65y+ (Fluzone)  COVID and Tdap booster in pharmacy.  Hemoglobin drop    Orders:    CBC; Future    Transferrin; Future    Iron and TIBC; Future    Ferritin; Future    Hyperlipidemia, unspecified hyperlipidemia type    Orders:    TSH; Future    Lipid panel; Future    Homocysteine, serum; Future    Routine general medical examination at health care facility    Orders:    1 Year Follow Up In [facility] - PCP - Wellness Exam; Future    Major depressive disorder, recurrent episode, moderate with anxious distress (Multi)  Well controlled on SSRI         Dependent edema  Avoidance of prolonged sitting, walking exercise, compression stocking, weight reduction       Tinnitus of both ears    Orders:    Referral to Audiology; Future    Referral to ENT; Future    Bilateral hearing loss, unspecified hearing loss type    Orders:    Referral to Audiology; Future    Referral to ENT; Future    Postnasal drip  Sinus lavage, Flonase nasal spray 2 sprays each nostril daily       Chronic low back pain without sciatica, unspecified back pain laterality    Orders:    Referral to Physical Therapy; Future    RLS (restless legs syndrome)    Orders:    pramipexole (Mirapex) 0.5 mg tablet; Take 1 tablet (0.5 mg) by mouth once daily at bedtime.    Transferrin; Future    Iron and TIBC; Future    Ferritin; Future    Memory change    Orders:    Methylmalonic Acid; Future    Homocysteine, serum; Future    Abdominal pressure  Recommended Ping Lax OTC, hydration, close phone follow up.       ACP was discussed, the documents will be presented to the office for scanning.    Obesity (BMI 30.0-34.9)

## 2024-09-24 DIAGNOSIS — R80.9 ALBUMINURIA: Primary | ICD-10-CM

## 2024-09-24 LAB — HCYS SERPL-SCNC: 16.03 UMOL/L (ref 5–13.9)

## 2024-09-24 RX ORDER — LOSARTAN POTASSIUM 25 MG/1
25 TABLET ORAL DAILY
Qty: 90 TABLET | Refills: 3 | Status: SHIPPED | OUTPATIENT
Start: 2024-09-24 | End: 2025-09-24

## 2024-09-26 LAB — METHYLMALONATE SERPL-SCNC: 0.62 UMOL/L (ref 0–0.4)

## 2024-09-27 ENCOUNTER — APPOINTMENT (OUTPATIENT)
Dept: BEHAVIORAL HEALTH | Facility: CLINIC | Age: 79
End: 2024-09-27
Payer: MEDICARE

## 2024-09-27 DIAGNOSIS — G47.00 INSOMNIA, UNSPECIFIED TYPE: ICD-10-CM

## 2024-09-27 DIAGNOSIS — F32.A DEPRESSION, UNSPECIFIED DEPRESSION TYPE: ICD-10-CM

## 2024-09-27 DIAGNOSIS — F41.9 ANXIETY: ICD-10-CM

## 2024-09-27 PROCEDURE — 1159F MED LIST DOCD IN RCRD: CPT | Performed by: CLINICAL NURSE SPECIALIST

## 2024-09-27 PROCEDURE — 1160F RVW MEDS BY RX/DR IN RCRD: CPT | Performed by: CLINICAL NURSE SPECIALIST

## 2024-09-27 PROCEDURE — 99214 OFFICE O/P EST MOD 30 MIN: CPT | Performed by: CLINICAL NURSE SPECIALIST

## 2024-09-27 RX ORDER — SERTRALINE HYDROCHLORIDE 50 MG/1
50 TABLET, FILM COATED ORAL DAILY
Qty: 90 TABLET | Refills: 2 | Status: SHIPPED | OUTPATIENT
Start: 2024-09-27 | End: 2024-12-26

## 2024-09-27 RX ORDER — SERTRALINE HYDROCHLORIDE 50 MG/1
50 TABLET, FILM COATED ORAL DAILY
Qty: 90 TABLET | Refills: 0 | Status: SHIPPED | OUTPATIENT
Start: 2024-09-27 | End: 2024-09-27

## 2024-09-27 NOTE — PROGRESS NOTES
Outpatient Psychiatry      Subjective   Angie Schroeder, a 78 y.o. female, presents as an established patient for follow up for a routine appointment for an in person in the office appointment for medication management /outpatient psychiatry      Diagnosis:   Depression, unspecified depression type stable F 32.A  Insomnia unspecified type mild F32.A  Anxiety F 41.9 stable         Problem List       Patient Active Problem List   Diagnosis    Anemia, pernicious    Angina pectoris (CMS/HCC)    Chest pain    Anxiety    Bilateral tinnitus    Cardiovascular symptoms    Chronic low back pain    Chronic rhinitis    CKD (chronic kidney disease)    Class 1 obesity    Coronary artery disease    D-dimer, elevated    Depression with anxiety    Diastolic dysfunction    Diffusion capacity of lung (dl), decreased    Erosive (osteo)arthritis    Actinic keratosis    Fibula fracture    Fatigue due to depression    GERD (gastroesophageal reflux disease)    Large hiatal hernia    Heart palpitations    Heartburn    Hemangioma of skin and subcutaneous tissue    Hyperlipidemia    Hypertension    IFG (impaired fasting glucose)    Insomnia    Lumbar radiculitis    Major depressive disorder, recurrent episode, moderate with anxious distress (CMS/HCC)    Melanocytic nevi of scalp and neck    Memory difficulties    Multinodular thyroid    Neoplasm of uncertain behavior of skin    Obstructive sleep apnea syndrome    Osteoarthritis of left hip    Osteopenia    Overactive bladder    Physical deconditioning    Restless leg syndrome    Right thyroid nodule    Scar condition and fibrosis of skin    Dyspnea    Urge incontinence of urine    Urinary urgency    Urticaria    Vitamin B 12 deficiency    Vitamin D deficiency    Asymmetrical hearing loss    Subacute bronchitis    Chronic cough            Treatment Goals:  Specify outcomes written in observable, behavioral terms:   Maintain stability of mental health and adhere to treatment      Treatment  Plan/Recommendations:   can continue self care and wellness efforts and maintain routine health screenings , can call  for treatment concerns , and scheduling concerns.  can follow up in 5 -6 months   Follow-up plan was discussed with patient.  Psychotropic medication :  Continue sertraline 50 mg , daily for insomnia         Review with patient: Treatment plan reviewed with the patient.  Medication risks/benefit reviewed with the patient     HPI:  mood overall stable   anxiety and worrying are manageable according to patient   sees medical providers regularly , has had ongoing medical appointments , reviewed notes in the [facility] emr for appointments with other medical providers  coping skills appear strong   can attend to daily activities   reconciled medication list in the [facility] emr and provided psychoeducation   she says she deals with some back pain from arthritis , and sciatica and scoliosis , and modifies activities   she does not have any worse concerns with memory   she says she has friends she enjoys spending time with      Psych ros and medical ros as noted above     Patient Active Problem List   Diagnosis    Anemia, pernicious    Angina pectoris (CMS-[facility])    Chest pain    Anxiety    Bilateral tinnitus    Cardiovascular symptoms    Chronic low back pain    Chronic rhinitis    CKD (chronic kidney disease)    Obesity (BMI 30.0-34.9)    Coronary artery disease    D-dimer, elevated    Depression with anxiety    Diastolic dysfunction    Diffusion capacity of lung (dl), decreased    Erosive (osteo)arthritis    Actinic keratosis    Fibula fracture    Fatigue due to depression    GERD (gastroesophageal reflux disease)    Large hiatal hernia    Heart palpitations    Heartburn    Hemangioma of skin and subcutaneous tissue    Hyperlipidemia    Hypertension    IFG (impaired fasting glucose)    Insomnia    Lumbar radiculitis    Major depressive disorder, recurrent episode, moderate with anxious distress  (Multi)    Melanocytic nevi of scalp and neck    Memory difficulties    Multinodular thyroid    Neoplasm of uncertain behavior of skin    Obstructive sleep apnea syndrome    Osteoarthritis of left hip    Osteopenia    Overactive bladder    Physical deconditioning    Restless leg syndrome    Right thyroid nodule    Scar condition and fibrosis of skin    Dyspnea    Urge incontinence of urine    Urinary urgency    Urticaria    Vitamin B 12 deficiency    Vitamin D deficiency    Asymmetrical hearing loss    Subacute bronchitis    Chronic cough    Albuminuria     Current Outpatient Medications:     albuterol 108 (90 Base) MCG/ACT inhaler, Inhale., Disp: , Rfl:     aspirin 81 mg capsule, Take 1 tablet by mouth once daily., Disp: , Rfl:     azelastine (Astelin) 137 mcg (0.1 %) nasal spray, Administer 1 spray into affected nostril(s) 2 times a day., Disp: , Rfl:     cetirizine (ZyrTEC) 10 mg tablet,disintegrating, Take 10 mg by mouth once daily as needed., Disp: , Rfl:     cholecalciferol (Vitamin D-3) 25 MCG (1000 UT) capsule, Take 1 capsule (25 mcg) by mouth once daily., Disp: 90 capsule, Rfl: 0    coenzyme Q-10 10 mg capsule, Take 1 capsule (10 mg) by mouth 2 times a day., Disp: , Rfl:     DILT- mg 24 hr capsule, Take 1 capsule (120 mg) by mouth once daily., Disp: 90 capsule, Rfl: 3    ketoconazole (NIZOral) 2 % cream, Apply topically., Disp: , Rfl:     losartan (Cozaar) 25 mg tablet, Take 1 tablet (25 mg) by mouth once daily., Disp: 90 tablet, Rfl: 3    nitroglycerin (Nitrostat) 0.4 mg SL tablet, Place 1 tablet (0.4 mg) under the tongue every 5 minutes if needed., Disp: , Rfl:     pramipexole (Mirapex) 0.5 mg tablet, Take 1 tablet (0.5 mg) by mouth once daily at bedtime., Disp: 30 tablet, Rfl: 2    rosuvastatin (Crestor) 10 mg tablet, Take 1 tablet (10 mg) by mouth once daily., Disp: 90 tablet, Rfl: 3    sertraline (Zoloft) 50 mg tablet, Take 1 tablet (50 mg) by mouth once daily., Disp: 90 tablet, Rfl: 0     solifenacin (VESIcare) 10 mg tablet, TAKE 1 TABLET BY MOUTH EVERY DAY, Disp: 90 tablet, Rfl: 0  Medical History:  Past Medical History:   Diagnosis Date    Anxiety disorder, unspecified 08/15/2022    Anxiety    Non-ST elevation (NSTEMI) myocardial infarction (Multi)     Non-ST elevation myocardial infarction (NSTEMI)    Other conditions influencing health status     Coronary Artery Disease    Personal history of other diseases of the musculoskeletal system and connective tissue 2014    Personal history of arthritis    Personal history of other diseases of the nervous system and sense organs     History of sleep apnea    Personal history of other endocrine, nutritional and metabolic disease     History of hyperlipidemia     Surgical History:  Past Surgical History:   Procedure Laterality Date    BREAST LUMPECTOMY  2013    Breast Surgery Lumpectomy    CATARACT EXTRACTION  2017    Cataract Surgery     SECTION, CLASSIC  2013     Section    CORONARY ARTERY BYPASS GRAFT  2013    CABG    CT AORTA AND BILATERAL ILIOFEMORAL RUNOFF ANGIOGRAM W AND/OR WO IV CONTRAST  2022    CT AORTA AND BILATERAL ILIOFEMORAL RUNOFF ANGIOGRAM W AND/OR WO IV CONTRAST 2022 [facility]    OTHER SURGICAL HISTORY  2013    Surgery Of The Eyelids    OTHER SURGICAL HISTORY  2021    Paraesophageal hiatal hernia repair    OTHER SURGICAL HISTORY  2019    Complete colonoscopy    SINUS SURGERY  2013    Sinus Surgery     Family History:  Family History   Problem Relation Name Age of Onset    Heart attack Mother      Other (food allergy) Mother      Coronary artery disease Mother      Other (cardiac disorder) Mother      Allergies Mother      Lymphoma Mother      Heart attack Father      Coronary artery disease Father      Other (cardiac disorder) Father      Heart attack Brother      Coronary artery disease Brother      Crohn's disease Brother      Other (cardiac  disorder) Brother      Colon cancer Paternal Grandmother       Social History:  Social History     Socioeconomic History    Marital status:      Spouse name: Not on file    Number of children: Not on file    Years of education: Not on file    Highest education level: Not on file   Occupational History    Not on file   Tobacco Use    Smoking status: Former     Types: Cigarettes    Smokeless tobacco: Never   Vaping Use    Vaping status: Never Used   Substance and Sexual Activity    Alcohol use: Yes     Alcohol/week: 2.0 standard drinks of alcohol     Types: 1 Glasses of wine, 1 Shots of liquor per week    Drug use: Not on file    Sexual activity: Not on file   Other Topics Concern    Not on file   Social History Narrative    Not on file     Social Determinants of Health     Financial Resource Strain: Not on file   Food Insecurity: Not on file   Transportation Needs: Not on file   Physical Activity: Not on file   Stress: Not on file   Social Connections: Not on file   Intimate Partner Violence: Not on file   Housing Stability: Not on file      Vitals:  There were no vitals filed for this visit.    Rossy Pandya, APRN-CNS

## 2024-09-30 DIAGNOSIS — D51.0 PERNICIOUS ANEMIA: Primary | ICD-10-CM

## 2024-09-30 RX ORDER — CYANOCOBALAMIN 1000 UG/ML
1000 INJECTION, SOLUTION INTRAMUSCULAR; SUBCUTANEOUS
Status: SHIPPED | OUTPATIENT
Start: 2024-10-01 | End: 2025-09-26

## 2024-10-17 DIAGNOSIS — G25.81 RLS (RESTLESS LEGS SYNDROME): ICD-10-CM

## 2024-10-17 RX ORDER — PRAMIPEXOLE DIHYDROCHLORIDE 0.5 MG/1
0.5 TABLET ORAL NIGHTLY
Qty: 90 TABLET | Refills: 2 | Status: SHIPPED | OUTPATIENT
Start: 2024-10-17

## 2024-10-31 ENCOUNTER — EVALUATION (OUTPATIENT)
Dept: PHYSICAL THERAPY | Facility: CLINIC | Age: 79
End: 2024-10-31
Payer: MEDICARE

## 2024-10-31 DIAGNOSIS — M54.50 CHRONIC LOW BACK PAIN WITHOUT SCIATICA, UNSPECIFIED BACK PAIN LATERALITY: Primary | ICD-10-CM

## 2024-10-31 DIAGNOSIS — G89.29 CHRONIC LOW BACK PAIN WITHOUT SCIATICA, UNSPECIFIED BACK PAIN LATERALITY: Primary | ICD-10-CM

## 2024-10-31 PROCEDURE — 97161 PT EVAL LOW COMPLEX 20 MIN: CPT | Mod: GP

## 2024-10-31 PROCEDURE — 97110 THERAPEUTIC EXERCISES: CPT | Mod: GP

## 2024-10-31 ASSESSMENT — ENCOUNTER SYMPTOMS
LOSS OF SENSATION IN FEET: 0
DEPRESSION: 0
OCCASIONAL FEELINGS OF UNSTEADINESS: 1

## 2024-10-31 ASSESSMENT — PAIN - FUNCTIONAL ASSESSMENT: PAIN_FUNCTIONAL_ASSESSMENT: 0-10

## 2024-10-31 ASSESSMENT — PAIN SCALES - GENERAL: PAINLEVEL_OUTOF10: 5 - MODERATE PAIN

## 2024-11-04 ASSESSMENT — PATIENT GLOBAL ASSESSMENT (PGA): PATIENT GLOBAL ASSESSMENT: PATIENT GLOBAL ASSESSMENT:  1 - CLEAR

## 2024-11-04 ASSESSMENT — DERMATOLOGY QUALITY OF LIFE (QOL) ASSESSMENT
ARE THERE EXCLUSIONS OR EXCEPTIONS FOR THE QUALITY OF LIFE ASSESSMENT: NO
RATE HOW EMOTIONALLY BOTHERED YOU ARE BY YOUR SKIN PROBLEM (FOR EXAMPLE, WORRY, EMBARRASSMENT, FRUSTRATION): 3
RATE HOW BOTHERED YOU ARE BY EFFECTS OF YOUR SKIN PROBLEMS ON YOUR ACTIVITIES (EG, GOING OUT, ACCOMPLISHING WHAT YOU WANT, WORK ACTIVITIES OR YOUR RELATIONSHIPS WITH OTHERS): 4
DATE THE QUALITY-OF-LIFE ASSESSMENT WAS COMPLETED: 67150
RATE HOW BOTHERED YOU ARE BY SYMPTOMS OF YOUR SKIN PROBLEM (EG, ITCHING, STINGING BURNING, HURTING OR SKIN IRRITATION): 6 - ALWAYS BOTHERED
RATE HOW EMOTIONALLY BOTHERED YOU ARE BY YOUR SKIN PROBLEM (FOR EXAMPLE, WORRY, EMBARRASSMENT, FRUSTRATION): 3
RATE HOW BOTHERED YOU ARE BY EFFECTS OF YOUR SKIN PROBLEMS ON YOUR ACTIVITIES (EG, GOING OUT, ACCOMPLISHING WHAT YOU WANT, WORK ACTIVITIES OR YOUR RELATIONSHIPS WITH OTHERS): 4
RATE HOW BOTHERED YOU ARE BY SYMPTOMS OF YOUR SKIN PROBLEM (EG, ITCHING, STINGING BURNING, HURTING OR SKIN IRRITATION): 6 - ALWAYS BOTHERED

## 2024-11-04 ASSESSMENT — DERMATOLOGY PATIENT ASSESSMENT: DO YOU HAVE IRREGULAR MENSTRUAL CYCLES: NO

## 2024-11-06 ENCOUNTER — APPOINTMENT (OUTPATIENT)
Dept: DERMATOLOGY | Facility: CLINIC | Age: 79
End: 2024-11-06
Payer: MEDICARE

## 2024-11-06 DIAGNOSIS — L30.4 INTERTRIGO: ICD-10-CM

## 2024-11-06 DIAGNOSIS — D22.9 MULTIPLE BENIGN MELANOCYTIC NEVI: ICD-10-CM

## 2024-11-06 DIAGNOSIS — L82.0 INFLAMED SEBORRHEIC KERATOSIS: ICD-10-CM

## 2024-11-06 DIAGNOSIS — L81.4 LENTIGO: ICD-10-CM

## 2024-11-06 DIAGNOSIS — L82.1 SEBORRHEIC KERATOSES: ICD-10-CM

## 2024-11-06 DIAGNOSIS — D18.01 CHERRY ANGIOMA: ICD-10-CM

## 2024-11-06 DIAGNOSIS — L57.8 SUN-DAMAGED SKIN: ICD-10-CM

## 2024-11-06 DIAGNOSIS — B35.1 ONYCHOMYCOSIS: ICD-10-CM

## 2024-11-06 DIAGNOSIS — Z12.83 SCREENING EXAM FOR SKIN CANCER: ICD-10-CM

## 2024-11-06 DIAGNOSIS — L57.0 ACTINIC KERATOSIS: Primary | ICD-10-CM

## 2024-11-06 DIAGNOSIS — L85.3 XEROSIS CUTIS: ICD-10-CM

## 2024-11-06 PROCEDURE — 17000 DESTRUCT PREMALG LESION: CPT | Performed by: DERMATOLOGY

## 2024-11-06 PROCEDURE — 17110 DESTRUCTION B9 LES UP TO 14: CPT | Performed by: DERMATOLOGY

## 2024-11-06 PROCEDURE — 1159F MED LIST DOCD IN RCRD: CPT | Performed by: DERMATOLOGY

## 2024-11-06 PROCEDURE — 99214 OFFICE O/P EST MOD 30 MIN: CPT | Performed by: DERMATOLOGY

## 2024-11-06 PROCEDURE — 17003 DESTRUCT PREMALG LES 2-14: CPT | Performed by: DERMATOLOGY

## 2024-11-06 RX ORDER — KETOCONAZOLE 20 MG/G
CREAM TOPICAL
Qty: 60 G | Refills: 11 | Status: SHIPPED | OUTPATIENT
Start: 2024-11-06

## 2024-11-06 ASSESSMENT — ITCH NUMERIC RATING SCALE: HOW SEVERE IS YOUR ITCHING?: 5

## 2024-11-06 NOTE — PROGRESS NOTES
Subjective     Angie Schroeder is a 78 y.o. female who presents for the following: Skin Check (Mid back, white spots: neck, shoulders, face, forehead; Hx of Aks, no history of skin ca. Pt. Requested ketoconazole refill).     Skin Cancer History  No skin cancer on file.      Review of Systems:  No other skin or systemic complaints other than what is documented elsewhere in the note.    The following portions of the chart were reviewed this encounter and updated as appropriate:       Specialty Problems          Dermatology Problems    Actinic keratosis    Hemangioma of skin and subcutaneous tissue    Melanocytic nevi of scalp and neck    Neoplasm of uncertain behavior of skin    Scar condition and fibrosis of skin    Urticaria     Past Medical History:  Angie Schroeder  has a past medical history of Anxiety disorder, unspecified (08/15/2022), Non-ST elevation (NSTEMI) myocardial infarction (Multi), Other conditions influencing health status, Personal history of other diseases of the musculoskeletal system and connective tissue (2014), Personal history of other diseases of the nervous system and sense organs, and Personal history of other endocrine, nutritional and metabolic disease.    Past Surgical History:  Angie Schroeder  has a past surgical history that includes Coronary artery bypass graft (2013);  section, classic (2013); Other surgical history (2013); Cataract extraction (2017); Breast lumpectomy (2013); Sinus surgery (2013); Other surgical history (2021); Other surgical history (2019); and CT angio aorta and bilateral iliofemoral runoff w and or wo IV contrast (2022).    Family History:  Patient family history includes Allergies in her mother; Colon cancer in her paternal grandmother; Coronary artery disease in her brother, father, and mother; Crohn's disease in her brother; Heart attack in her brother, father, and mother; Lymphoma in  her mother; cardiac disorder in her brother, father, and mother; food allergy in her mother.    Social History:  Angie Schroeder  reports that she has quit smoking. Her smoking use included cigarettes. She has never used smokeless tobacco. She reports current alcohol use of about 2.0 standard drinks of alcohol per week. No history on file for drug use.    Allergies:  Amoxicillin-pot clavulanate, Beta-blockers (beta-adrenergic blocking agts), Furosemide, Metoprolol, Procaine, Proparacaine, and Sulfa (sulfonamide antibiotics)    Current Medications / CAM's:    Current Outpatient Medications:     albuterol 108 (90 Base) MCG/ACT inhaler, Inhale., Disp: , Rfl:     aspirin 81 mg capsule, Take 1 tablet by mouth once daily., Disp: , Rfl:     azelastine (Astelin) 137 mcg (0.1 %) nasal spray, Administer 1 spray into affected nostril(s) 2 times a day., Disp: , Rfl:     cetirizine (ZyrTEC) 10 mg tablet,disintegrating, Take 10 mg by mouth once daily as needed., Disp: , Rfl:     cholecalciferol (Vitamin D-3) 25 MCG (1000 UT) capsule, Take 1 capsule (25 mcg) by mouth once daily., Disp: 90 capsule, Rfl: 0    coenzyme Q-10 10 mg capsule, Take 1 capsule (10 mg) by mouth 2 times a day., Disp: , Rfl:     DILT- mg 24 hr capsule, Take 1 capsule (120 mg) by mouth once daily., Disp: 90 capsule, Rfl: 3    losartan (Cozaar) 25 mg tablet, Take 1 tablet (25 mg) by mouth once daily., Disp: 90 tablet, Rfl: 3    nitroglycerin (Nitrostat) 0.4 mg SL tablet, Place 1 tablet (0.4 mg) under the tongue every 5 minutes if needed., Disp: , Rfl:     pramipexole (Mirapex) 0.5 mg tablet, Take 1 tablet (0.5 mg) by mouth once daily at bedtime., Disp: 90 tablet, Rfl: 2    rosuvastatin (Crestor) 10 mg tablet, Take 1 tablet (10 mg) by mouth once daily., Disp: 90 tablet, Rfl: 3    sertraline (Zoloft) 50 mg tablet, Take 1 tablet (50 mg) by mouth once daily., Disp: 90 tablet, Rfl: 2    solifenacin (VESIcare) 10 mg tablet, TAKE 1 TABLET BY MOUTH EVERY DAY,  "Disp: 90 tablet, Rfl: 0    ketoconazole (NIZOral) 2 % cream, Apply topically in a thin layer daily when flaring to affected skin under the breasts, Disp: 60 g, Rfl: 11    Current Facility-Administered Medications:     cyanocobalamin (Vitamin B-12) injection 1,000 mcg, 1,000 mcg, intramuscular, q30 days, Francesca Tobias MD     Objective   Well appearing patient in no apparent distress; mood and affect are within normal limits.    A full examination was performed including scalp, head, eyes, ears, nose, lips, neck, chest, axillae, abdomen, back, buttocks, bilateral upper extremities, bilateral lower extremities, hands, feet, fingers, toes, fingernails, and toenails. Patient has finger and toenail polish in place. Patient declined genital and gluteal cleft exam.   All findings within normal limits unless otherwise noted below.    - Scattered waxy tan/grey/brown papules with horn cysts    - scattered small bright red papules and macules    - scattered tan macules, telangiectasias, and general photo-damage    - scattered regular brown macules and papules    Scattered tan macules in sun-exposed areas.    Diffuse dry, flaky skin    Left Buccal Cheek, Left Malar Cheek, Right Ear  Erythematous macules with gritty scale.    Left Upper Back  Inflamed seborrheic keratoses: pink and brown \"stuck on\" verrucous scaly papule with surrounding erythema and bloody crust.    Left Inframammary Fold, Right Inframammary Fold  Erythematous pink plaques and moist skin under the bilateral breasts    Left Foot - Anterior, Right Foot - Anterior  Nails with subungual debris       Assessment/Plan   Actinic keratosis (3)  Right Ear; Left Malar Cheek; Left Buccal Cheek    - The premalignant nature of the disorder was reviewed and treatment options were reviewed.   - Patient agreeable to treatment with cryotherapy today.  Sites confirmed. Risks and benefits reviewed including but not limited to pain, redness, swelling, blister, scab, healing " with hypo or hyperpigmentation, and scar. Chance of recurrence or persistence reviewed.     Destr of lesion - Left Buccal Cheek, Left Malar Cheek, Right Ear  Complexity: simple    Destruction method: cryotherapy    Informed consent: discussed and consent obtained    Lesion destroyed using liquid nitrogen: Yes    Region frozen until ice ball extended beyond lesion: Yes    Cryotherapy cycles:  1  Outcome: patient tolerated procedure well with no complications    Post-procedure details: wound care instructions given      Inflamed seborrheic keratosis  Left Upper Back    - The benign nature of the disorder was reviewed and treatment options were reviewed.   - Patient agreeable to treatment with cryotherapy today.  Sites confirmed. Risks and benefits reviewed including but not limited to pain, redness, swelling, blister, scab, healing with hypo or hyperpigmentation, and scar. Chance of recurrence or persistence reviewed.   -Lesions are symptomatic with pruritus    Destr of lesion - Left Upper Back  Complexity: simple    Destruction method: cryotherapy    Informed consent: discussed and consent obtained    Lesion destroyed using liquid nitrogen: Yes    Region frozen until ice ball extended beyond lesion: Yes    Cryotherapy cycles:  1  Outcome: patient tolerated procedure well with no complications    Post-procedure details: wound care instructions given      Seborrheic keratoses    Seborrheic keratosis (-es)  - Discussed benign nature and that no treatment is necessary unless it becomes painful or increases in size. Patient opts for clinical monitoring at this time.      Related Procedures  Follow Up In Dermatology - Established Patient    Intertrigo  Left Inframammary Fold; Right Inframammary Fold    -Continue use of over the counter Micanazole drying powder for the area to reduce moisture  -Refilled patients ketoconazole 2% cream, patient uses daily when flaring  -Counseled to apply to the umbilicus as well    Related  Medications  ketoconazole (NIZOral) 2 % cream  Apply topically in a thin layer daily when flaring to affected skin under the breasts    Cherry angioma    Cherry angioma(s)  - Discussed benign nature and that no treatment is necessary unless it becomes painful or increases in size. Patient opts for clinical monitoring at this time.      Sun-damaged skin    Actinically damaged skin  - Sun protective behavior reviewed and encouraged including the use of over-the-counter sunscreen with SPF30+ daily (reapply every 1.5 hours when outdoors), UPF clothing, broad rimmed hats, sunglasses, and avoidance of midday sun. Home skin monitoring encouraged and how to monitor for skin cancer (changing or new moles, new rapidly growing or non-healing lesions) reviewed. Patient encouraged to call with interval concerns or changes.      Multiple benign melanocytic nevi    Benign melanocytic nevi  - Discussed benign nature and that no treatment is necessary unless it becomes painful or increases in size. Patient opts for clinical monitoring at this time.    - Sun protective behavior reviewed and encouraged including the use of over-the-counter sunscreen with SPF30+ daily (reapply every 1.5 hours when outdoors), UPF clothing, broad rimmed hats, sunglasses, and avoidance of midday sun. Home skin monitoring encouraged and how to monitor for skin cancer (changing or new moles, new rapidly growing or non-healing lesions) reviewed. Patient encouraged to call with interval concerns or changes.      Lentigo    - Benign finding; no intervention indicated today.      Xerosis cutis    -Advise moisturizing with plain vaseline, can apply after bathing to lock in moisture to dry skin    Onychomycosis (2)  Left Foot - Anterior; Right Foot - Anterior    -Patient elects to monitor  -Briefly discussed option to apply a prescription ciclopirox nail polish for treatment, or systemic medication with terbinafine however patient is not a candidate due to  existing medications    Screening exam for skin cancer    Related Procedures  Follow Up In Dermatology - Established Patient       Return to clinic in 12 months for full body skin check.    Olivia Barr MD  Department of Dermatology      I saw and evaluated the patient, participating in the key elements of the service.  I discussed the findings, assessment and plan with the resident and agree with resident’s findings and plan as documented in the resident's note.  I was immediately available for the entirety of the procedure(s) and present for the key and critical portions.     Maryellen Diaz MD

## 2024-11-19 ENCOUNTER — HOSPITAL ENCOUNTER (OUTPATIENT)
Dept: RADIOLOGY | Facility: CLINIC | Age: 79
Discharge: HOME | End: 2024-11-19
Payer: MEDICARE

## 2024-11-19 VITALS — HEIGHT: 61 IN | WEIGHT: 185 LBS | BODY MASS INDEX: 34.93 KG/M2

## 2024-11-19 DIAGNOSIS — Z78.0 ASYMPTOMATIC MENOPAUSE: ICD-10-CM

## 2024-11-19 DIAGNOSIS — Z12.31 ENCOUNTER FOR SCREENING MAMMOGRAM FOR MALIGNANT NEOPLASM OF BREAST: ICD-10-CM

## 2024-11-19 PROCEDURE — 77080 DXA BONE DENSITY AXIAL: CPT

## 2024-11-19 PROCEDURE — 77080 DXA BONE DENSITY AXIAL: CPT | Performed by: RADIOLOGY

## 2024-11-19 PROCEDURE — 77067 SCR MAMMO BI INCL CAD: CPT

## 2024-11-19 PROCEDURE — 77063 BREAST TOMOSYNTHESIS BI: CPT | Performed by: RADIOLOGY

## 2024-11-19 PROCEDURE — 77067 SCR MAMMO BI INCL CAD: CPT | Performed by: RADIOLOGY

## 2024-11-20 ENCOUNTER — APPOINTMENT (OUTPATIENT)
Dept: RADIOLOGY | Facility: CLINIC | Age: 79
End: 2024-11-20
Payer: MEDICARE

## 2024-11-25 ENCOUNTER — APPOINTMENT (OUTPATIENT)
Dept: PHYSICAL THERAPY | Facility: CLINIC | Age: 79
End: 2024-11-25
Payer: MEDICARE

## 2024-12-02 ENCOUNTER — TREATMENT (OUTPATIENT)
Dept: PHYSICAL THERAPY | Facility: CLINIC | Age: 79
End: 2024-12-02
Payer: MEDICARE

## 2024-12-02 DIAGNOSIS — G89.29 CHRONIC LOW BACK PAIN WITHOUT SCIATICA, UNSPECIFIED BACK PAIN LATERALITY: Primary | ICD-10-CM

## 2024-12-02 DIAGNOSIS — M54.50 CHRONIC LOW BACK PAIN WITHOUT SCIATICA, UNSPECIFIED BACK PAIN LATERALITY: Primary | ICD-10-CM

## 2024-12-02 PROCEDURE — 97110 THERAPEUTIC EXERCISES: CPT | Mod: GP

## 2024-12-02 ASSESSMENT — PAIN - FUNCTIONAL ASSESSMENT: PAIN_FUNCTIONAL_ASSESSMENT: 0-10

## 2024-12-02 ASSESSMENT — PAIN SCALES - GENERAL: PAINLEVEL_OUTOF10: 5 - MODERATE PAIN

## 2024-12-02 NOTE — PROGRESS NOTES
Physical Therapy  Physical Therapy Treatment    Patient Name: Angie Schroeder  MRN: [id number]  Today's Date: 12/2/2024  Time Calculation  Start Time: 1345  Stop Time: 1424  Time Calculation (min): 39 min      PT Therapeutic Procedures Time Entry  Therapeutic Exercise Time Entry: 39          Insurance:  Visit number: 2 of [place]  Authorization info: no auth  Insurance Type: Payor: MEDICARE / Plan: MEDICARE PART A AND B / Product Type: *No Product type* /      Current Problem  1. Chronic low back pain without sciatica, unspecified back pain laterality              Referred by: Dr. Tobias    General:  General  Reason for Referral: Chronic midline  LBP  Referred By: Dr. Tobias    Precautions:  Precautions  STEADI Fall Risk Score (The score of 4 or more indicates an increased risk of falling): 3  Precautions Comment: PMH of triple bypass, asthma, OA  Medical History Form: Reviewed (scanned into chart)    Subjective:   Patient reports she is feeling ok today, feels more off balance than usual. Pain is roughly the same. Has not performed HEP yet, due to holidays and family constraints.    Pain:  Pain Assessment: 0-10  0-10 (Numeric) Pain Score: 5 - Moderate pain    Objective:      Thoracic AROM (Degrees)    Flexion: wfl  Extension: wfl  (L) Side Bend: wfl  (R) Side Bend: wfl  (L) Rotation: wfl  (R) Rotation: wfl      Lumbar AROM (Degrees)    Flexion: wfl, produces  Repeated Flexion: reduces  RFIL: wfl  Extension: mod-severe limitation + pain free  Repeated Extensions: no better no worse  REIL: wfl  (L) Side Bend: produces, repeated improves   (R) Side Bend: wfl + pain free  (L) Rotation: produces, repeated improves  (R) Rotation: wfl    Hip AROM (Degrees)    (R)  (L)  Flexion: wfl  wfl   Extension: wfl  wfl    Abduction: wfl  wfl    Adduction: wfl  wfl    ER:  wfl  wfl    IR:  wfl  wfl      Hip PROM  (Degrees)    (R)  (L)  Flexion: wfl  wfl     Extension: wfl  wfl    Abduction: wfl  wfl    Adduction: wfl  wfl    ER:  wfl  wfl    IR:  wfl  Wfl        Strength Testing  Core/Abdominals: Sahrmann level .5  Hip    (R)  (L)  Flexion: 3  3     Extension: 3-  3-    Abduction: 3+  3+    Adduction: 4-  4-    ER:  4-  3+    IR:  4  4-      Joint Mobility  Hypomobile throughout thoracolumbar spine, pain produced w/ PA glides throughout lumbar and SIJ    Special Tests  Slump: neg  SLR: neg  FADDIR: neg  ARLINE: NT    SI cluster ([place], thigh thrust, sacral thrust, compression, distraction)  NT    Outcome Measures:        Treatments:    There-ex: 39'  Recumbent bike seat 5 x 5'  Shuttle leg press w/ 50# 4x12  Seated hip IR w/ adductor ball squeeze 4x12 w/ yellow TB   Retro walking w/ sport cord x10  Side stepping w/ sport cord x 10 per side  Seated leg curls w/ 12# 3x12  Bridges 4x5  Lumbar rotations 2x20*    * = added to HEP.  Sheet with exercise descriptions and images issued.  Skilled intervention utilized in the appropriate selection & application of above exercises.  Verbal and tactile cues provided for proper form and technique.  Pt. demonstrated appropriate form & verbalized understanding of optimal technique for above exercises.      [email or web address]    Access Code: [email or web address]    * = added to HEP.  Sheet with exercise descriptions and images issued.  Skilled intervention utilized in the appropriate selection & application of above exercises.  Verbal and tactile cues provided for proper form and technique.  Pt. demonstrated appropriate form & verbalized understanding of optimal technique for above exercises.      EDUCATION: Home exercise program, plan of care, activity modifications, pain management, and injury pathology       Assessment:   Patient tolerated today's session w/ expected muscle fatigue. Tolerated all exercises fairly well, was challenged w/ resisted side stepping, bridges and  hamstring curls. Patient will benefit from ongoing PT services to progress hip and core strengthening activities as tolerated to reach established goals to maximize functional mobility.       Plan:  Continue core and hip strengthening as tolerated. Emphasis on posterior chain activities: standing hip extensions, banded hip hinge, standing hamstring curls.     Goals: Set and discussed today  Active       Chronic midline LBP       STGs       Start:  10/31/24    Expected End:  12/05/24       1. Patient will demonstrate independence w/ HEP to allow for self-management of symptoms   2. Patient will demonstrate increased strength on the Sahrmann scale for core strength to level 1 to progress towards LTG and decrease LB stain.   3. Patient will demonstrate improved lumbar extension  ROM to mod limitation + pain free to demonstrate an improved tolerance to load.   4. Patient will report a decrease in pain by at least 2 points to meet the MCID    5. Patient will tolerate walking for 1/2 mile w/ a reported decrease in symptoms to progress towards long term goals.  6. Patient will demonstrate improved gross hip strength to at least 4- to progress towards LTGs.         LTGs       Start:  10/31/24    Expected End:  01/09/25       1. Patient will report a decrease in pain w/ stair negotiation by at least 2 points to meet the MDC and improve performance of ADLs.   2. Patient will be able to tolerate standing for more than 1 hour w/o a return or increase in familiar pain to demonstrate an improved tolerance to functional mobility.   3. Patient will report an improved score on modified MEGAN by at least 10 points to meet the MDC   4. Patient will demonstrate improved strength during Sahrmann core strength testing to level 1.5 to decrease LB strain and improve ADL performance.   5. Patient will tolerate walking for more than 1 mile w/o a return of pain to demonstrate an improved tolerance to load.   6. Patient will demonstrate improved  hip gross hip strength to at least 4+ to improve ADLs.               Screening  Frequency  Date Last Completed   Spiritual and Cultural Beliefs   Screening  each visit or episode of care 8/5/2024   Falls Risk Screening  every ambulatory visit    Pain Screening  annually at primary care visit  8/5/2024   Domestic Violence screening  annually at primary care visit 8/5/2024   Elder Abuse Screening  annually at primary care visit    Depression Screening  annually in the primary care setting 9/23/2024   Suicide Risk Screening  annually in the primary care setting 8/5/2024   Nutrition and Food Insecurity   Screening  at least annually at primary care visit     Key Learner  annually in the primary care setting 8/5/2024   Drug Screen  9/23/2024 12:57 PM   Alcohol Screen  9/23/2024 12:57 PM   Advance Directive           Plan of care was developed with input and agreement by the patient      Andrei Zamudio, PT, ATC

## 2024-12-09 ENCOUNTER — TREATMENT (OUTPATIENT)
Dept: PHYSICAL THERAPY | Facility: CLINIC | Age: 79
End: 2024-12-09
Payer: MEDICARE

## 2024-12-09 DIAGNOSIS — M54.50 CHRONIC LOW BACK PAIN WITHOUT SCIATICA, UNSPECIFIED BACK PAIN LATERALITY: Primary | ICD-10-CM

## 2024-12-09 DIAGNOSIS — G89.29 CHRONIC LOW BACK PAIN WITHOUT SCIATICA, UNSPECIFIED BACK PAIN LATERALITY: Primary | ICD-10-CM

## 2024-12-09 PROCEDURE — 97110 THERAPEUTIC EXERCISES: CPT | Mod: GP

## 2024-12-09 ASSESSMENT — PAIN - FUNCTIONAL ASSESSMENT: PAIN_FUNCTIONAL_ASSESSMENT: 0-10

## 2024-12-09 ASSESSMENT — PAIN SCALES - GENERAL: PAINLEVEL_OUTOF10: 0 - NO PAIN

## 2024-12-09 NOTE — PROGRESS NOTES
Physical Therapy  Physical Therapy Treatment    Patient Name: Angie Schroeder  MRN: [id number]  Today's Date: 12/9/2024  Time Calculation  Start Time: 1432  Stop Time: 1512  Time Calculation (min): 40 min      PT Therapeutic Procedures Time Entry  Therapeutic Exercise Time Entry: 40          Insurance:  Visit number: 3 of MN  Authorization info: no auth  Insurance Type: Payor: MEDICARE / Plan: MEDICARE PART A AND B / Product Type: *No Product type* /      Current Problem  1. Chronic low back pain without sciatica, unspecified back pain laterality                Referred by: Dr. Tobias    General:  General  Reason for Referral: Chronic midline  LBP  Referred By: Dr. Tobias    Precautions:  Precautions  STEADI Fall Risk Score (The score of 4 or more indicates an increased risk of falling): 3  Precautions Comment: PMH of triple bypass, asthma, OA  Medical History Form: Reviewed (scanned into chart)    Subjective:   Patient reports she is doing very well today, no LBP entering the clinic. She is experiencing some tightness in her hip and LB. Did not feel soreness or pain after last session.    Pain:  Pain Assessment: 0-10  0-10 (Numeric) Pain Score: 0 - No pain    Objective:      Thoracic AROM (Degrees)    Flexion: wfl  Extension: wfl  (L) Side Bend: wfl  (R) Side Bend: wfl  (L) Rotation: wfl  (R) Rotation: wfl      Lumbar AROM (Degrees)    Flexion: wfl, produces  Repeated Flexion: reduces  RFIL: wfl  Extension: mod-severe limitation + pain free  Repeated Extensions: no better no worse  REIL: wfl  (L) Side Bend: produces, repeated improves   (R) Side Bend: wfl + pain free  (L) Rotation: produces, repeated improves  (R) Rotation: wfl    Hip AROM (Degrees)    (R)  (L)  Flexion: wfl  wfl   Extension: wfl  wfl    Abduction: wfl  wfl    Adduction: wfl  wfl    ER:  wfl  wfl    IR:  wfl  wfl      Hip PROM  (Degrees)    (R)  (L)  Flexion: wfl  wfl     Extension: wfl  wfl    Abduction: wfl  wfl    Adduction: wfl  wfl    ER:  wfl  wfl    IR:  wfl  Wfl        Strength Testing  Core/Abdominals: Sahrmann level .5  Hip    (R)  (L)  Flexion: 3  3     Extension: 3-  3-    Abduction: 3+  3+    Adduction: 4-  4-    ER:  4-  3+    IR:  4  4-      Joint Mobility  Hypomobile throughout thoracolumbar spine, pain produced w/ PA glides throughout lumbar and SIJ    Special Tests  Slump: neg  SLR: neg  FADDIR: neg  ARLINE: NT    SI cluster ([place], thigh thrust, sacral thrust, compression, distraction)  NT    Outcome Measures:        Treatments:    There-ex: 40'  Recumbent bike seat 5 x 5'  Standing hip extensions w/ 2# ankle weight 3x10-12 per side  Standing hamstring curls w/ 2# 3x12 per side  Seated DL's w/ 10# KB 3x8-10  Yellow stroops TB rows 3x12*  Yellow stroops TB straight arm pull downs 3x12  Yellow stroops TB pallof side stepping elbows flexed 2x10 per side  Deadbug UE alt lifts only w/ yellow physioball holds x20    * = added to HEP.  Sheet with exercise descriptions and images issued.  Skilled intervention utilized in the appropriate selection & application of above exercises.  Verbal and tactile cues provided for proper form and technique.  Pt. demonstrated appropriate form & verbalized understanding of optimal technique for above exercises.      [email or web address]    Access Code: [email or web address]    * = added to HEP.  Sheet with exercise descriptions and images issued.  Skilled intervention utilized in the appropriate selection & application of above exercises.  Verbal and tactile cues provided for proper form and technique.  Pt. demonstrated appropriate form & verbalized understanding of optimal technique for above exercises.      EDUCATION: Home exercise program, plan of care, activity modifications, pain management, and injury pathology       Assessment:   Patient tolerated today's session w/ expected  muscle fatigue and soreness. Demonstrates improvements in core and hip strength displayed through progression of there-ex to include pallof side stepping and UE dead-bugs /o a production of familiar symptoms. Patient was challenged w/ straight arm pull-downs and seated dead-lifts. Patient will benefit from ongoing PT services to continue to progress hip and core strengthening activities as tolerated to reach established goals to maximize functional mobility.      Plan:  Recheck and update goals. Continue to emphasize posterior chain strengthening; diagonal chops, progress bridges to include abduction or adduction, clamshells.     Goals: Set and discussed today  Active       Chronic midline LBP       STGs       Start:  10/31/24    Expected End:  12/16/24       1. Patient will demonstrate independence w/ HEP to allow for self-management of symptoms   2. Patient will demonstrate increased strength on the Sahrmann scale for core strength to level 1 to progress towards LTG and decrease LB stain.   3. Patient will demonstrate improved lumbar extension  ROM to mod limitation + pain free to demonstrate an improved tolerance to load.   4. Patient will report a decrease in pain by at least 2 points to meet the MCID    5. Patient will tolerate walking for 1/2 mile w/ a reported decrease in symptoms to progress towards long term goals.  6. Patient will demonstrate improved gross hip strength to at least 4- to progress towards LTGs.         LTGs       Start:  10/31/24    Expected End:  01/09/25       1. Patient will report a decrease in pain w/ stair negotiation by at least 2 points to meet the MDC and improve performance of ADLs.   2. Patient will be able to tolerate standing for more than 1 hour w/o a return or increase in familiar pain to demonstrate an improved tolerance to functional mobility.   3. Patient will report an improved score on modified MEGAN by at least 10 points to meet the MDC   4. Patient will demonstrate  improved strength during [facility] core strength testing to level 1.5 to decrease LB strain and improve ADL performance.   5. Patient will tolerate walking for more than 1 mile w/o a return of pain to demonstrate an improved tolerance to load.   6. Patient will demonstrate improved hip gross hip strength to at least 4+ to improve ADLs.               Screening  Frequency  Date Last Completed   Spiritual and Cultural Beliefs   Screening  each visit or episode of care 8/5/2024   Falls Risk Screening  every ambulatory visit    Pain Screening  annually at primary care visit  8/5/2024   Domestic Violence screening  annually at primary care visit 8/5/2024   Elder Abuse Screening  annually at primary care visit    Depression Screening  annually in the primary care setting 9/23/2024   Suicide Risk Screening  annually in the primary care setting 8/5/2024   Nutrition and Food Insecurity   Screening  at least annually at primary care visit     Key Learner  annually in the primary care setting 8/5/2024   Drug Screen  9/23/2024 12:57 PM   Alcohol Screen  9/23/2024 12:57 PM   Advance Directive           Plan of care was developed with input and agreement by the patient      Andrei Zamudio, PT, ATC

## 2024-12-16 ENCOUNTER — TREATMENT (OUTPATIENT)
Dept: PHYSICAL THERAPY | Facility: CLINIC | Age: 79
End: 2024-12-16
Payer: MEDICARE

## 2024-12-16 DIAGNOSIS — M54.50 CHRONIC LOW BACK PAIN WITHOUT SCIATICA, UNSPECIFIED BACK PAIN LATERALITY: Primary | ICD-10-CM

## 2024-12-16 DIAGNOSIS — G89.29 CHRONIC LOW BACK PAIN WITHOUT SCIATICA, UNSPECIFIED BACK PAIN LATERALITY: Primary | ICD-10-CM

## 2024-12-16 PROCEDURE — 97110 THERAPEUTIC EXERCISES: CPT | Mod: GP

## 2024-12-16 ASSESSMENT — PAIN SCALES - GENERAL: PAINLEVEL_OUTOF10: 0 - NO PAIN

## 2024-12-16 ASSESSMENT — PAIN - FUNCTIONAL ASSESSMENT: PAIN_FUNCTIONAL_ASSESSMENT: 0-10

## 2024-12-16 NOTE — PROGRESS NOTES
Physical Therapy  Physical Therapy Treatment    Patient Name: Angie Schroeder  MRN: [id number]  Today's Date: 12/16/2024  Time Calculation  Start Time: 1345  Stop Time: 1425  Time Calculation (min): 40 min      PT Therapeutic Procedures Time Entry  Therapeutic Exercise Time Entry: 40          Insurance:  Visit number: 4 of [place]  Authorization info: no auth  Insurance Type: Payor: MEDICARE / Plan: MEDICARE PART A AND B / Product Type: *No Product type* /      Current Problem  1. Chronic low back pain without sciatica, unspecified back pain laterality                  Referred by: Dr. Tobias    General:  General  Reason for Referral: Chronic midline  LBP  Referred By: Dr. Tobias    Precautions:  Precautions  STEADI Fall Risk Score (The score of 4 or more indicates an increased risk of falling): 3  Precautions Comment: PMH of triple bypass, asthma, OA  Medical History Form: Reviewed (scanned into chart)    Subjective:   Patient reports she is feeling better, a little sore in her legs but not in any back pain.     Pain:  Pain Assessment: 0-10  0-10 (Numeric) Pain Score: 0 - No pain    Objective:      Thoracic AROM (Degrees)    Flexion: wfl  Extension: wfl  (L) Side Bend: wfl  (R) Side Bend: wfl  (L) Rotation: wfl  (R) Rotation: wfl      Lumbar AROM (Degrees) updated 12/16    Flexion: wfl, produces  Repeated Flexion: reduces  RFIL: wfl  Extension: min-mod limitation + pain free  Repeated Extensions: no better no worse  REIL: wfl  (L) Side Bend: wfl + pain free  (R) Side Bend: wfl + pain free  (L) Rotation: wfl + pain free  (R) Rotation: wfl    Hip AROM (Degrees)    (R)  (L)  Flexion: wfl  wfl   Extension: wfl  wfl    Abduction: wfl  wfl    Adduction: wfl  wfl    ER:  wfl  wfl    IR:  wfl  wfl      Hip PROM (Degrees)    (R)  (L)  Flexion: wfl  wfl     Extension: wfl  wfl    Abduction: wfl  wfl    Adduction: wfl  wfl    ER:  wfl  wfl    IR:  wfl  Wfl        Strength Testing updated 12/16  Core/Abdominals:  Sahrmann level 1  Hip    (R)  (L)  Flexion: 3+  3+     Extension: 3  3    Abduction: 3+  3+    Adduction: 4-  4-    ER:  5  5    IR:  5  4      Joint Mobility  Hypomobile throughout thoracolumbar spine, pain produced w/ PA glides throughout lumbar and SIJ    Special Tests  Slump: neg  SLR: neg  FADDIR: neg  ARLINE: NT    SI cluster ([place], thigh thrust, sacral thrust, compression, distraction)  NT    Outcome Measures:        Treatments:    There-ex: 40'  Recumbent bike seat 5 x 5'  Bridges x10  Bridges w/ adductor ball squeeze x10  Bridges w/ abductor isometric x10  Red TB marches seated 3x10 per side  Cable high to low diagonal chops w/ 7.5# 3x10 per side  Cable M raises w/ 2.5# per side 2x15  Clamshells w/ red TB * 2x10 per side    * = added to HEP.  Sheet with exercise descriptions and images issued.  Skilled intervention utilized in the appropriate selection & application of above exercises.  Verbal and tactile cues provided for proper form and technique.  Pt. demonstrated appropriate form & verbalized understanding of optimal technique for above exercises.      [email or web address]    Access Code: [email or web address]    * = added to HEP.  Sheet with exercise descriptions and images issued.  Skilled intervention utilized in the appropriate selection & application of above exercises.  Verbal and tactile cues provided for proper form and technique.  Pt. demonstrated appropriate form & verbalized understanding of optimal technique for above exercises.      EDUCATION: Home exercise program, plan of care, activity modifications, pain management, and injury pathology       Assessment:   Patient tolerated today's session w/ expected muscle fatigue and soreness. Demonstrates improvements in hip rotational and core strength, as well as tolerance to spinal mobility compared to the time of her initial eval. Patient  is progressing, continues to have deficits in hip and core strength, as well as tolerance to  spinal flexion and will benefit from ongoing PT services to continue to progress mobility and strengthening activities as tolerated to reach established goals to maximize functional mobility.      Plan:  Trial of SA rows, seated hip IR w/ adductor squeeze. Standing marches, pallof circles, standing T raises.     Goals: Set and discussed today  Active       Chronic midline LBP       STGs (Progressing)       Start:  10/31/24    Expected End:  12/30/24       1. Patient will demonstrate independence w/ HEP to allow for self-management of symptoms   2. Patient will demonstrate increased strength on the Sahrmann scale for core strength to level 1 to progress towards LTG and decrease LB stain.   3. Patient will demonstrate improved lumbar extension  ROM to mod limitation + pain free to demonstrate an improved tolerance to load.   4. Patient will report a decrease in pain by at least 2 points to meet the MCID    5. Patient will tolerate walking for 1/2 mile w/ a reported decrease in symptoms to progress towards long term goals.  6. Patient will demonstrate improved gross hip strength to at least 4- to progress towards LTGs.         LTGs       Start:  10/31/24    Expected End:  01/09/25       1. Patient will report a decrease in pain w/ stair negotiation by at least 2 points to meet the MDC and improve performance of ADLs.   2. Patient will be able to tolerate standing for more than 1 hour w/o a return or increase in familiar pain to demonstrate an improved tolerance to functional mobility.   3. Patient will report an improved score on modified MEGAN by at least 10 points to meet the MDC   4. Patient will demonstrate improved strength during Sahrmann core strength testing to level 1.5 to decrease LB strain and improve ADL performance.   5. Patient will tolerate walking for more than 1 mile w/o a return of pain to demonstrate an improved tolerance to load.   6. Patient will demonstrate improved hip gross hip strength to at  least 4+ to improve ADLs.               Screening  Frequency  Date Last Completed   Spiritual and Cultural Beliefs   Screening  each visit or episode of care 8/5/2024   Falls Risk Screening  every ambulatory visit    Pain Screening  annually at primary care visit  8/5/2024   Domestic Violence screening  annually at primary care visit 8/5/2024   Elder Abuse Screening  annually at primary care visit    Depression Screening  annually in the primary care setting 9/23/2024   Suicide Risk Screening  annually in the primary care setting 8/5/2024   Nutrition and Food Insecurity   Screening  at least annually at primary care visit     Key Learner  annually in the primary care setting 8/5/2024   Drug Screen  9/23/2024 12:57 PM   Alcohol Screen  9/23/2024 12:57 PM   Advance Directive           Plan of care was developed with input and agreement by the patient      Andrei Zamudio, PT, ATC

## 2024-12-30 ENCOUNTER — APPOINTMENT (OUTPATIENT)
Dept: PHYSICAL THERAPY | Facility: CLINIC | Age: 79
End: 2024-12-30
Payer: MEDICARE

## 2025-01-06 ENCOUNTER — APPOINTMENT (OUTPATIENT)
Dept: PHYSICAL THERAPY | Facility: CLINIC | Age: 80
End: 2025-01-06
Payer: MEDICARE

## 2025-01-06 DIAGNOSIS — G89.29 CHRONIC LOW BACK PAIN WITHOUT SCIATICA, UNSPECIFIED BACK PAIN LATERALITY: Primary | ICD-10-CM

## 2025-01-06 DIAGNOSIS — M54.50 CHRONIC LOW BACK PAIN WITHOUT SCIATICA, UNSPECIFIED BACK PAIN LATERALITY: Primary | ICD-10-CM

## 2025-01-29 ENCOUNTER — APPOINTMENT (OUTPATIENT)
Dept: DERMATOLOGY | Facility: CLINIC | Age: 80
End: 2025-01-29
Payer: MEDICARE

## 2025-02-12 ENCOUNTER — APPOINTMENT (OUTPATIENT)
Dept: PHYSICAL THERAPY | Facility: CLINIC | Age: 80
End: 2025-02-12
Payer: MEDICARE

## 2025-02-12 DIAGNOSIS — G89.29 CHRONIC LOW BACK PAIN WITHOUT SCIATICA, UNSPECIFIED BACK PAIN LATERALITY: Primary | ICD-10-CM

## 2025-02-12 DIAGNOSIS — M54.50 CHRONIC LOW BACK PAIN WITHOUT SCIATICA, UNSPECIFIED BACK PAIN LATERALITY: Primary | ICD-10-CM

## 2025-02-19 ENCOUNTER — APPOINTMENT (OUTPATIENT)
Dept: PHYSICAL THERAPY | Facility: CLINIC | Age: 80
End: 2025-02-19
Payer: MEDICARE

## 2025-02-19 DIAGNOSIS — G89.29 CHRONIC LOW BACK PAIN WITHOUT SCIATICA, UNSPECIFIED BACK PAIN LATERALITY: Primary | ICD-10-CM

## 2025-02-19 DIAGNOSIS — M54.50 CHRONIC LOW BACK PAIN WITHOUT SCIATICA, UNSPECIFIED BACK PAIN LATERALITY: Primary | ICD-10-CM

## 2025-03-28 ENCOUNTER — APPOINTMENT (OUTPATIENT)
Dept: BEHAVIORAL HEALTH | Facility: CLINIC | Age: 80
End: 2025-03-28
Payer: MEDICARE

## 2025-03-28 DIAGNOSIS — F41.9 ANXIETY: ICD-10-CM

## 2025-03-28 DIAGNOSIS — F32.A DEPRESSION, UNSPECIFIED DEPRESSION TYPE: ICD-10-CM

## 2025-03-28 DIAGNOSIS — G47.00 INSOMNIA, UNSPECIFIED TYPE: ICD-10-CM

## 2025-03-28 PROCEDURE — 99213 OFFICE O/P EST LOW 20 MIN: CPT | Performed by: CLINICAL NURSE SPECIALIST

## 2025-03-28 PROCEDURE — 1159F MED LIST DOCD IN RCRD: CPT | Performed by: CLINICAL NURSE SPECIALIST

## 2025-03-28 PROCEDURE — 1160F RVW MEDS BY RX/DR IN RCRD: CPT | Performed by: CLINICAL NURSE SPECIALIST

## 2025-03-28 RX ORDER — SERTRALINE HYDROCHLORIDE 50 MG/1
50 TABLET, FILM COATED ORAL DAILY
Qty: 90 TABLET | Refills: 2 | Status: SHIPPED | OUTPATIENT
Start: 2025-03-28 | End: 2025-06-26

## 2025-03-28 NOTE — PROGRESS NOTES
Outpatient Psychiatry      Subjective   Angie Schroeder, a 79 y.o. female, presents as an established patient for follow up for a routine appointment for an audio and video virtual appointment from home   Virtual or Telephone Consent    An interactive audio and video telecommunication system which permits real time communications between the patient (at the originating site) and provider (at the distant site) was utilized to provide this telehealth service.   Verbal consent was requested and obtained from Angie Schroeder on this date, 03/28/25 for a telehealth visit and the patient's location was confirmed at the time of the visit.     Diagnosis:   Depression, unspecified depression type stable F 32.A  Insomnia unspecified type mild F32.A  Anxiety F 41.9 stable         Problem List         Patient Active Problem List   Diagnosis    Anemia, pernicious    Angina pectoris (CMS/HCC)    Chest pain    Anxiety    Bilateral tinnitus    Cardiovascular symptoms    Chronic low back pain    Chronic rhinitis    CKD (chronic kidney disease)    Class 1 obesity    Coronary artery disease    D-dimer, elevated    Depression with anxiety    Diastolic dysfunction    Diffusion capacity of lung (dl), decreased    Erosive (osteo)arthritis    Actinic keratosis    Fibula fracture    Fatigue due to depression    GERD (gastroesophageal reflux disease)    Large hiatal hernia    Heart palpitations    Heartburn    Hemangioma of skin and subcutaneous tissue    Hyperlipidemia    Hypertension    IFG (impaired fasting glucose)    Insomnia    Lumbar radiculitis    Major depressive disorder, recurrent episode, moderate with anxious distress (CMS/HCC)    Melanocytic nevi of scalp and neck    Memory difficulties    Multinodular thyroid    Neoplasm of uncertain behavior of skin    Obstructive sleep apnea syndrome    Osteoarthritis of left hip    Osteopenia    Overactive bladder    Physical deconditioning    Restless leg syndrome    Right thyroid  nodule    Scar condition and fibrosis of skin    Dyspnea    Urge incontinence of urine    Urinary urgency    Urticaria    Vitamin B 12 deficiency    Vitamin D deficiency    Asymmetrical hearing loss    Subacute bronchitis    Chronic cough            Treatment Goals:  Specify outcomes written in observable, behavioral terms:   Maintain stability of mental health and adhere to treatment      Treatment Plan/Recommendations:   can continue self care and wellness efforts and maintain routine health screenings , can call  for treatment concerns , and scheduling concerns.  can follow up in 5 -6 months   Follow-up plan was discussed with patient.  Psychotropic medication :  Continue sertraline 50 mg , daily for depression and anxiety         Review with patient: Treatment plan reviewed with the patient.  Medication risks/benefit reviewed with the patient     HPI:  mood overall stable   anxiety and worrying are manageable according to patient , she mentioned there was some family stress and she handled this well   sees medical providers regularly , has had ongoing medical appointments , reviewed notes in the [facility] for appointments with other medical providers  coping skills appear strong   can attend to daily activities   reconciled medication list in the [facility] and provided psychoeducation   she says she deals with some back pain from arthritis , and sciatica and scoliosis , and modifies activities , she has done physical therapy in the past   she does not have any worse concerns with memory   she says she has friends she enjoys spending time with   No issues with substance abuse      Psych ros and medical ros as noted above      Patient Active Problem List   Diagnosis    Anemia, pernicious    Angina pectoris    Chest pain    Anxiety    Bilateral tinnitus    Cardiovascular symptoms    Chronic low back pain    Chronic rhinitis    CKD (chronic kidney disease)    Obesity (BMI 30.0-34.9)    Coronary artery  disease    D-dimer, elevated    Depression with anxiety    Diastolic dysfunction    Diffusion capacity of lung (dl), decreased    Erosive (osteo)arthritis    Actinic keratosis    Fibula fracture    Fatigue due to depression    GERD (gastroesophageal reflux disease)    Large hiatal hernia    Heart palpitations    Heartburn    Hemangioma of skin and subcutaneous tissue    Hyperlipidemia    Hypertension    IFG (impaired fasting glucose)    Insomnia    Lumbar radiculitis    Major depressive disorder, recurrent episode, moderate with anxious distress (Multi)    Melanocytic nevi of scalp and neck    Memory difficulties    Multinodular thyroid    Neoplasm of uncertain behavior of skin    Obstructive sleep apnea syndrome    Osteoarthritis of left hip    Osteopenia    Overactive bladder    Physical deconditioning    Restless leg syndrome    Right thyroid nodule    Scar condition and fibrosis of skin    Dyspnea    Urge incontinence of urine    Urinary urgency    Urticaria    Vitamin B 12 deficiency    Vitamin D deficiency    Asymmetrical hearing loss    Subacute bronchitis    Chronic cough    Albuminuria     Current Outpatient Medications:     albuterol 108 (90 Base) MCG/ACT inhaler, Inhale., Disp: , Rfl:     aspirin 81 mg capsule, Take 1 tablet by mouth once daily., Disp: , Rfl:     azelastine (Astelin) 137 mcg (0.1 %) nasal spray, Administer 1 spray into affected nostril(s) 2 times a day., Disp: , Rfl:     cetirizine (ZyrTEC) 10 mg tablet,disintegrating, Take 10 mg by mouth once daily as needed., Disp: , Rfl:     cholecalciferol (Vitamin D-3) 25 MCG (1000 UT) capsule, Take 1 capsule (25 mcg) by mouth once daily., Disp: 90 capsule, Rfl: 0    coenzyme Q-10 10 mg capsule, Take 1 capsule (10 mg) by mouth 2 times a day., Disp: , Rfl:     DILT- mg 24 hr capsule, Take 1 capsule (120 mg) by mouth once daily., Disp: 90 capsule, Rfl: 3    ketoconazole (NIZOral) 2 % cream, Apply topically in a thin layer daily when flaring to  affected skin under the breasts, Disp: 60 g, Rfl: 11    losartan (Cozaar) 25 mg tablet, Take 1 tablet (25 mg) by mouth once daily., Disp: 90 tablet, Rfl: 3    nitroglycerin (Nitrostat) 0.4 mg SL tablet, Place 1 tablet (0.4 mg) under the tongue every 5 minutes if needed., Disp: , Rfl:     pramipexole (Mirapex) 0.5 mg tablet, Take 1 tablet (0.5 mg) by mouth once daily at bedtime., Disp: 90 tablet, Rfl: 2    rosuvastatin (Crestor) 10 mg tablet, Take 1 tablet (10 mg) by mouth once daily., Disp: 90 tablet, Rfl: 3    sertraline (Zoloft) 50 mg tablet, Take 1 tablet (50 mg) by mouth once daily., Disp: 90 tablet, Rfl: 2    solifenacin (VESIcare) 10 mg tablet, TAKE 1 TABLET BY MOUTH EVERY DAY, Disp: 90 tablet, Rfl: 0    Current Facility-Administered Medications:     cyanocobalamin (Vitamin B-12) injection 1,000 mcg, 1,000 mcg, intramuscular, q30 days, Francesca Tobias MD  Medical History:  Past Medical History:   Diagnosis Date    Allergic     Anxiety     Anxiety disorder, unspecified 08/15/2022    Anxiety    Arthritis     Asthma     Depression     Eczema     Heart disease     HL (hearing loss)     Non-ST elevation (NSTEMI) myocardial infarction (Multi)     Non-ST elevation myocardial infarction (NSTEMI)    Other conditions influencing health status     Coronary Artery Disease    Personal history of other diseases of the musculoskeletal system and connective tissue 2014    Personal history of arthritis    Personal history of other diseases of the nervous system and sense organs     History of sleep apnea    Personal history of other endocrine, nutritional and metabolic disease     History of hyperlipidemia    Scoliosis      Surgical History:  Past Surgical History:   Procedure Laterality Date    APPENDECTOMY  1969    BREAST LUMPECTOMY  2013    Breast Surgery Lumpectomy    CARDIAC CATHETERIZATION  2013    CATARACT EXTRACTION  2017    Cataract Surgery     SECTION, CLASSIC  2013      Section     SECTION, LOW TRANSVERSE  69&70    CORONARY ARTERY BYPASS GRAFT  2013    CABG    CT ANGIO AORTA AND BILATERAL ILIOFEMORAL RUN OFF INCLUDING WITHOUT CONTRAST IF PERFORMED  2022    CT AORTA AND BILATERAL ILIOFEMORAL RUNOFF ANGIOGRAM W AND/OR WO IV CONTRAST 2022 [facility]    OTHER SURGICAL HISTORY  2013    Surgery Of The Eyelids    OTHER SURGICAL HISTORY  2021    Paraesophageal hiatal hernia repair    OTHER SURGICAL HISTORY  2019    Complete colonoscopy    SINUS SURGERY  2013    Sinus Surgery    SKIN BIOPSY       Family History:  Family History   Problem Relation Name Age of Onset    Heart attack Mother Alma Bay     Other (food allergy) Mother Alma [place]     Coronary artery disease Mother Alma [place]     Other (cardiac disorder) Mother Alma [place]     Allergies Mother Alma [place]     Lymphoma Mother Alma [place]     Cancer Mother Alma Bay     Heart disease Mother Alma Bay     Ovarian cancer Mother Alma Bay     Heart attack Father Caden [place]     Coronary artery disease Father Caden [place]     Other (cardiac disorder) Father Caden [place]     Diabetes Father Caden Wuhlman     Heart disease Father Caden Mehlman     Heart attack Brother Romain [place]     Coronary artery disease Brother Romain [place]     Crohn's disease Brother Romain [place]     Other (cardiac disorder) Brother Romain [place]     Heart disease Brother Romain Bay     Colon cancer Paternal Grandmother Laureen Bay     Stroke Paternal Grandfather Romain [place]      Social History:  Social History     Socioeconomic History    Marital status:      Spouse name: Not on file    Number of children: Not on file    Years of education: Not on file    Highest education level: Not on file   Occupational History    Not on file   Tobacco Use    Smoking status: Former     Types: Cigarettes    Smokeless tobacco: Never   Vaping Use    Vaping  status: Never Used   Substance and Sexual Activity    Alcohol use: Yes     Alcohol/week: 2.0 standard drinks of alcohol     Types: 1 Glasses of wine, 1 Shots of liquor per week    Drug use: Not on file    Sexual activity: Not on file   Other Topics Concern    Not on file   Social History Narrative    Not on file     Social Drivers of Health     Financial Resource Strain: Not on file   Food Insecurity: Not on file   Transportation Needs: Not on file   Physical Activity: Not on file   Stress: Not on file   Social Connections: Not on file   Intimate Partner Violence: Not on file   Housing Stability: Not on file     Vitals:  There were no vitals filed for this visit.    Rossy Pandya, APRN-CNS

## 2025-04-11 DIAGNOSIS — N32.81 OVERACTIVE BLADDER: ICD-10-CM

## 2025-04-15 RX ORDER — SOLIFENACIN SUCCINATE 10 MG/1
10 TABLET, FILM COATED ORAL DAILY
Qty: 30 TABLET | Refills: 0 | Status: SHIPPED | OUTPATIENT
Start: 2025-04-15

## 2025-05-07 DIAGNOSIS — N32.81 OVERACTIVE BLADDER: ICD-10-CM

## 2025-05-07 RX ORDER — SOLIFENACIN SUCCINATE 10 MG/1
10 TABLET, FILM COATED ORAL DAILY
Qty: 90 TABLET | Refills: 0 | Status: SHIPPED | OUTPATIENT
Start: 2025-05-07

## 2025-06-18 NOTE — PROGRESS NOTES
FOLLOW-UP VISIT       HISTORY OF PRESENT ILLNESS:   Angie Schroeder is a 79 y.o. female presenting to me today for follow-up of OAB. Patient was last evaluated on 6/15/23. At that visit Patient was overall doing well with Vesicare for her OAB symptoms, however she noted of dry mouth with the medication which she said was not bothersome at that time. Plan was to continue the patient on Vesicare daily.     Today the patient reports to be doing well with Vesicare. Would like to have this medication refilled today.    PAST MEDICAL HISTORY:  Medical History[1]    PAST SURGICAL HISTORY:  Surgical History[2]     ALLERGIES:   Allergies[3]     MEDICATIONS:   Medication Documentation Review Audit       Reviewed by GLADYS Arechiga (Nurse Practitioner) on 03/28/25 at 1316      Medication Order Taking? Sig Documenting Provider Last Dose Status   albuterol 108 (90 Base) MCG/ACT inhaler [id number] No Inhale. Historical Provider, MD Taking Active   aspirin 81 mg capsule [id number] No Take 1 tablet by mouth once daily. Historical Provider, MD Taking Active   azelastine (Astelin) 137 mcg (0.1 %) nasal spray [id number] No Administer 1 spray into affected nostril(s) 2 times a day. Historical Provider, MD Taking Active   cetirizine (ZyrTEC) 10 mg tablet,disintegrating [id number] No Take 10 mg by mouth once daily as needed. Historical Provider, MD Taking Active   cholecalciferol (Vitamin D-3) 25 MCG (1000 UT) capsule [id number]  Take 1 capsule (25 mcg) by mouth once daily. Francesca Tobias MD  Active   coenzyme Q-10 10 mg capsule [id number] No Take 1 capsule (10 mg) by mouth 2 times a day. Historical Provider, MD Taking Active   cyanocobalamin (Vitamin B-12) injection 1,000 mcg [id number]   Francesca Tobias MD  Active   DILT- mg 24 hr capsule [id number] No Take 1 capsule (120 mg) by mouth once daily. Kishore Rose MD Taking Active   ketoconazole (NIZOral) 2 % cream [id number]  Apply topically in a thin layer daily when  flaring to affected skin under the breasts Olivia Barr MD  Active   losartan (Cozaar) 25 mg tablet [id number]  Take 1 tablet (25 mg) by mouth once daily. Francesca Tobias MD  Active   nitroglycerin (Nitrostat) 0.4 mg SL tablet [id number] No Place 1 tablet (0.4 mg) under the tongue every 5 minutes if needed. Becky Ibrahim MD Taking Active   pramipexole (Mirapex) 0.5 mg tablet [id number]  Take 1 tablet (0.5 mg) by mouth once daily at bedtime. Francesca Tobias MD  Active   rosuvastatin (Crestor) 10 mg tablet [id number]  Take 1 tablet (10 mg) by mouth once daily. Kishore Rose MD  Active   sertraline (Zoloft) 50 mg tablet [id number]  Take 1 tablet (50 mg) by mouth once daily. Rossy Pandya, APRN-CNS  Active   solifenacin (VESIcare) 10 mg tablet [id number] No TAKE 1 TABLET BY MOUTH EVERY DAY Gabriela Su MD MPH Taking Active                     SOCIAL HISTORY:  Patient  reports that she has quit smoking. Her smoking use included cigarettes. She has never used smokeless tobacco. She reports current alcohol use of about 2.0 standard drinks of alcohol per week.   Social History     Socioeconomic History    Marital status:      Spouse name: Not on file    Number of children: Not on file    Years of education: Not on file    Highest education level: Not on file   Occupational History    Not on file   Tobacco Use    Smoking status: Former     Types: Cigarettes    Smokeless tobacco: Never   Vaping Use    Vaping status: Never Used   Substance and Sexual Activity    Alcohol use: Yes     Alcohol/week: 2.0 standard drinks of alcohol     Types: 1 Glasses of wine, 1 Shots of liquor per week    Drug use: Not on file    Sexual activity: Not on file   Other Topics Concern    Not on file   Social History Narrative    Not on file     Social Drivers of Health     Financial Resource Strain: Not on file   Food Insecurity: Not on file   Transportation Needs: Not on file   Physical Activity: Not on file   Stress: Not  on file   Social Connections: Not on file   Intimate Partner Violence: Not on file   Housing Stability: Not on file       FAMILY HISTORY:  Family History[4]    REVIEW OF SYSTEMS:  Constitutional: Negative for fever and chills. Denies anorexia, weight loss.  Eyes: Negative for visual disturbance.   Respiratory: Negative for shortness of breath.    Cardiovascular: Negative for chest pain.   Gastrointestinal: Negative for nausea and vomiting.   Genitourinary: See interval history above.  Skin: Negative for rash.   Neurological: Negative for dizziness and numbness.   Psychiatric/Behavioral: Negative for confusion and decreased concentration.     PHYSICAL EXAM:  There were no vitals taken for this visit.  Constitutional: Patient appears well-developed and well-nourished. No distress.    Head: Normocephalic and atraumatic.    Neck: Normal range of motion.    Cardiovascular: Normal rate.    Pulmonary/Chest: Effort normal. No respiratory distress.   Musculoskeletal: Normal range of motion.    Neurological: Alert and oriented to person, place, and time.  Psychiatric: Normal mood and affect. Behavior is normal. Thought content normal.       Assessment:      1. Overactive bladder            Angie Schroeder is a 79 y.o. female with OAB.     Patient is doing well with Vesicare and states the dry mouth is not bothersome at this time. I will refill her medications for 1 year and she will follow-up annually.      Plan:   Prescription for Vesicare 10 mg tablets sent to the patient's pharmacy.    Follow-up annually.       Gabriela Su MD MPH      Scribe Attestation  By signing my name below, I Elaina Mabe, Scribe   attest that this documentation has been prepared under the direction and in the presence of Garbiela Su MD MPH.            [1]   Past Medical History:  Diagnosis Date    Allergic     Anxiety     Anxiety disorder, unspecified 08/15/2022    Anxiety    Arthritis     Asthma     Depression     Eczema     Heart disease      HL (hearing loss)     Non-ST elevation (NSTEMI) myocardial infarction (Multi)     Non-ST elevation myocardial infarction (NSTEMI)    Other conditions influencing health status     Coronary Artery Disease    Personal history of other diseases of the musculoskeletal system and connective tissue 2014    Personal history of arthritis    Personal history of other diseases of the nervous system and sense organs     History of sleep apnea    Personal history of other endocrine, nutritional and metabolic disease     History of hyperlipidemia    Scoliosis    [2]   Past Surgical History:  Procedure Laterality Date    APPENDECTOMY  1969    BREAST LUMPECTOMY  2013    Breast Surgery Lumpectomy    CARDIAC CATHETERIZATION  2013    CATARACT EXTRACTION  2017    Cataract Surgery     SECTION, CLASSIC  2013     Section     SECTION, LOW TRANSVERSE  69&70    CORONARY ARTERY BYPASS GRAFT  2013    CABG    CT ANGIO AORTA AND BILATERAL ILIOFEMORAL RUN OFF INCLUDING WITHOUT CONTRAST IF PERFORMED  2022    CT AORTA AND BILATERAL ILIOFEMORAL RUNOFF ANGIOGRAM W AND/OR WO IV CONTRAST 2022 [facility]    OTHER SURGICAL HISTORY  2013    Surgery Of The Eyelids    OTHER SURGICAL HISTORY  2021    Paraesophageal hiatal hernia repair    OTHER SURGICAL HISTORY  2019    Complete colonoscopy    SINUS SURGERY  2013    Sinus Surgery    SKIN BIOPSY     [3]   Allergies  Allergen Reactions    Amoxicillin-Pot Clavulanate Unknown    Beta-Blockers (Beta-Adrenergic Blocking Agts) Unknown    Furosemide Hives    Metoprolol Other    Procaine Other    Proparacaine Unknown    Sulfa (Sulfonamide Antibiotics) Hives   [4]   Family History  Problem Relation Name Age of Onset    Heart attack Mother Alma Bay     Other (food allergy) Mother Alma Bay     Coronary artery disease Mother Alma [place]     Other (cardiac disorder) Mother Alma [place]     Allergies  Mother Alma Wujagrutieliana     Lymphoma Mother Alma [place]     Cancer Mother Alma [place]     Heart disease Mother Alma [place]     Ovarian cancer Mother Alma [place]     Heart attack Father Caden Wujagrutieliana     Coronary artery disease Father Caden Wujagrutieliana     Other (cardiac disorder) Father Caden Wujagrutieliana     Diabetes Father Caden [place]     Heart disease Father Caden Wujagrutieliana     Heart attack Brother Romain Shanique     Coronary artery disease Brother Romain Shanique     Crohn's disease Brother Romain [place]     Other (cardiac disorder) Brother Romain Shanique     Heart disease Brother Romain Shanique     Colon cancer Paternal Grandmother Laureen Arnoldeliana     Stroke Paternal Grandfather Romain [place]

## 2025-06-19 ENCOUNTER — APPOINTMENT (OUTPATIENT)
Dept: UROLOGY | Facility: CLINIC | Age: 80
End: 2025-06-19
Payer: MEDICARE

## 2025-06-19 VITALS
TEMPERATURE: 97.7 F | HEIGHT: 61 IN | DIASTOLIC BLOOD PRESSURE: 82 MMHG | HEART RATE: 79 BPM | BODY MASS INDEX: 34.55 KG/M2 | WEIGHT: 183 LBS | SYSTOLIC BLOOD PRESSURE: 162 MMHG

## 2025-06-19 DIAGNOSIS — N32.81 OVERACTIVE BLADDER: Primary | ICD-10-CM

## 2025-06-19 LAB
POC APPEARANCE, URINE: CLEAR
POC BILIRUBIN, URINE: NEGATIVE
POC BLOOD, URINE: ABNORMAL
POC COLOR, URINE: YELLOW
POC GLUCOSE, URINE: NEGATIVE MG/DL
POC KETONES, URINE: NEGATIVE MG/DL
POC LEUKOCYTES, URINE: ABNORMAL
POC NITRITE,URINE: NEGATIVE
POC PH, URINE: 7 PH
POC PROTEIN, URINE: ABNORMAL MG/DL
POC SPECIFIC GRAVITY, URINE: 1.02
POC UROBILINOGEN, URINE: 0.2 EU/DL

## 2025-06-19 PROCEDURE — 1036F TOBACCO NON-USER: CPT | Performed by: OBSTETRICS & GYNECOLOGY

## 2025-06-19 PROCEDURE — 1159F MED LIST DOCD IN RCRD: CPT | Performed by: OBSTETRICS & GYNECOLOGY

## 2025-06-19 PROCEDURE — 81003 URINALYSIS AUTO W/O SCOPE: CPT | Performed by: OBSTETRICS & GYNECOLOGY

## 2025-06-19 PROCEDURE — 3079F DIAST BP 80-89 MM HG: CPT | Performed by: OBSTETRICS & GYNECOLOGY

## 2025-06-19 PROCEDURE — G2211 COMPLEX E/M VISIT ADD ON: HCPCS | Performed by: OBSTETRICS & GYNECOLOGY

## 2025-06-19 PROCEDURE — 99213 OFFICE O/P EST LOW 20 MIN: CPT | Performed by: OBSTETRICS & GYNECOLOGY

## 2025-06-19 PROCEDURE — 3077F SYST BP >= 140 MM HG: CPT | Performed by: OBSTETRICS & GYNECOLOGY

## 2025-06-19 RX ORDER — SOLIFENACIN SUCCINATE 10 MG/1
10 TABLET, FILM COATED ORAL DAILY
Qty: 90 TABLET | Refills: 1 | Status: SHIPPED | OUTPATIENT
Start: 2025-06-19 | End: 2025-12-16

## 2025-06-19 ASSESSMENT — COLUMBIA-SUICIDE SEVERITY RATING SCALE - C-SSRS
6. HAVE YOU EVER DONE ANYTHING, STARTED TO DO ANYTHING, OR PREPARED TO DO ANYTHING TO END YOUR LIFE?: NO
2. HAVE YOU ACTUALLY HAD ANY THOUGHTS OF KILLING YOURSELF?: NO
1. IN THE PAST MONTH, HAVE YOU WISHED YOU WERE DEAD OR WISHED YOU COULD GO TO SLEEP AND NOT WAKE UP?: NO

## 2025-06-19 ASSESSMENT — PATIENT HEALTH QUESTIONNAIRE - PHQ9
1. LITTLE INTEREST OR PLEASURE IN DOING THINGS: NOT AT ALL
SUM OF ALL RESPONSES TO PHQ9 QUESTIONS 1 AND 2: 0
2. FEELING DOWN, DEPRESSED OR HOPELESS: NOT AT ALL

## 2025-08-05 ENCOUNTER — OFFICE VISIT (OUTPATIENT)
Dept: CARDIOLOGY | Facility: CLINIC | Age: 80
End: 2025-08-05
Payer: MEDICARE

## 2025-08-05 VITALS
OXYGEN SATURATION: 92 % | SYSTOLIC BLOOD PRESSURE: 125 MMHG | HEIGHT: 61 IN | DIASTOLIC BLOOD PRESSURE: 79 MMHG | WEIGHT: 179.06 LBS | HEART RATE: 86 BPM | BODY MASS INDEX: 33.81 KG/M2

## 2025-08-05 DIAGNOSIS — R42 DIZZINESS AND GIDDINESS: ICD-10-CM

## 2025-08-05 DIAGNOSIS — I10 HYPERTENSION, UNSPECIFIED TYPE: ICD-10-CM

## 2025-08-05 DIAGNOSIS — I25.118 CORONARY ARTERY DISEASE OF NATIVE HEART WITH STABLE ANGINA PECTORIS, UNSPECIFIED VESSEL OR LESION TYPE: Primary | ICD-10-CM

## 2025-08-05 DIAGNOSIS — E78.5 HYPERLIPIDEMIA, UNSPECIFIED HYPERLIPIDEMIA TYPE: ICD-10-CM

## 2025-08-05 DIAGNOSIS — R07.9 CHEST PAIN, UNSPECIFIED TYPE: ICD-10-CM

## 2025-08-05 PROBLEM — R00.2 HEART PALPITATIONS: Status: RESOLVED | Noted: 2023-08-31 | Resolved: 2025-08-05

## 2025-08-05 PROBLEM — I51.89 DIASTOLIC DYSFUNCTION: Status: RESOLVED | Noted: 2023-08-31 | Resolved: 2025-08-05

## 2025-08-05 PROBLEM — I20.9 ANGINA PECTORIS: Status: RESOLVED | Noted: 2023-08-31 | Resolved: 2025-08-05

## 2025-08-05 PROBLEM — R09.89 CARDIOVASCULAR SYMPTOMS: Status: RESOLVED | Noted: 2023-08-31 | Resolved: 2025-08-05

## 2025-08-05 PROCEDURE — 99212 OFFICE O/P EST SF 10 MIN: CPT

## 2025-08-05 PROCEDURE — 3078F DIAST BP <80 MM HG: CPT | Performed by: INTERNAL MEDICINE

## 2025-08-05 PROCEDURE — 3074F SYST BP LT 130 MM HG: CPT | Performed by: INTERNAL MEDICINE

## 2025-08-05 PROCEDURE — 93005 ELECTROCARDIOGRAM TRACING: CPT | Performed by: INTERNAL MEDICINE

## 2025-08-05 PROCEDURE — 1159F MED LIST DOCD IN RCRD: CPT | Performed by: INTERNAL MEDICINE

## 2025-08-05 PROCEDURE — 1126F AMNT PAIN NOTED NONE PRSNT: CPT | Performed by: INTERNAL MEDICINE

## 2025-08-05 PROCEDURE — 93010 ELECTROCARDIOGRAM REPORT: CPT | Performed by: INTERNAL MEDICINE

## 2025-08-05 PROCEDURE — 99214 OFFICE O/P EST MOD 30 MIN: CPT | Performed by: INTERNAL MEDICINE

## 2025-08-05 ASSESSMENT — ENCOUNTER SYMPTOMS
CHILLS: 0
NAUSEA: 0
HEMOPTYSIS: 0
MEMORY LOSS: 0
BLOATING: 0
VOMITING: 0
HEMATURIA: 0
FEVER: 0
CONSTIPATION: 0
DIARRHEA: 0
ALTERED MENTAL STATUS: 0
WHEEZING: 0
ABDOMINAL PAIN: 0
DYSURIA: 0
FALLS: 0
MYALGIAS: 0
OCCASIONAL FEELINGS OF UNSTEADINESS: 1
COUGH: 0
HEADACHES: 0
LOSS OF SENSATION IN FEET: 0
DEPRESSION: 0

## 2025-08-05 ASSESSMENT — PAIN SCALES - GENERAL: PAINLEVEL_OUTOF10: 0-NO PAIN

## 2025-08-05 NOTE — PROGRESS NOTES
Chief Complaint   Patient presents with    Follow-up    Coronary Artery Disease       HPI  80 yo WF w/ h/o CAD s/p CABG 1/13 (LIMA->LAD, SVG->OM, SVG->RCA), PAD, HTN, HLD, parox SVT, KUSH (intol CPAP) now here for cardiology f/u. Over the past month, she is having dizziness after bending over or with head movement.   She has long h/o occ jaw/chest pain/pressure only in bed that resolved immed with Tums, no assoc symptoms. No other CP including during the day or with activity.   No dyspnea at rest. +ch mild CROW. No orthopnea/PND. No palps. No syncope. +occ mild LE edema, less on Krill oil. No further claudication. No cough. +occ fatigue  ECG 8/20: SR (80), nonsp T-wave changes, ?IMI  ECG 7/21: SR (76), nonsp T-wave changes,  IMI.  ECG 5/22: SR (82),  IMI  ECG 8/25: SR (86), ?ILMI  HM 10/19: SR, HR  (avg 80), VT x4 (long 20b), SVT x23 (long 9sec)  Echo 8/21: EF 60-65%, DD, mod MAC  Echo 6/22: EF 65-70%, sep shud  Nuc 10/19: no ischemia, inflat scar, EF >65%  Nuc 9/21: no ischemia, inflat scar, EF >65%  CTA chest 8/20: no PE, large HH  CT chest 1/22: mod cor calc, nl heart size, no peric eff, mild athero of Ao, no aneurysm, nl PA  PFT 11/21: no obst, sub red DLCO  LE US 7/20: no DVT  MAMTA 6/22: R PT 1.15 DP 1.8, L PT 1.02 DP 1.37  CTA periph 6/22: dif par calc athero dz, B SFA mild, B tib mild-mod, RA no stenosis    Review of Systems   Constitutional: Negative for chills, fever and malaise/fatigue.   HENT:  Negative for hearing loss.    Eyes:  Negative for visual disturbance.   Respiratory:  Negative for cough, hemoptysis and wheezing.    Skin:  Negative for rash.   Musculoskeletal:  Negative for falls and myalgias.   Gastrointestinal:  Negative for bloating, abdominal pain, constipation, diarrhea, dysphagia, nausea and vomiting.   Genitourinary:  Negative for dysuria and hematuria.   Neurological:  Negative for headaches.   Psychiatric/Behavioral:  Negative for altered mental status, depression and memory  loss.       Social History     Tobacco Use    Smoking status: Former     Types: Cigarettes    Smokeless tobacco: Never   Substance Use Topics    Alcohol use: Yes     Alcohol/week: 2.0 standard drinks of alcohol     Types: 1 Glasses of wine, 1 Shots of liquor per week      Family History   Problem Relation Name Age of Onset    Heart attack Mother [place]ma [place]     Other (food allergy) Mother [place]ma [place]     Coronary artery disease Mother [place]ma [place]     Other (cardiac disorder) Mother Alma [place]     Allergies Mother Alma [place]     Lymphoma Mother Alma [place]     Cancer Mother Alma [place]     Heart disease Mother Alma [place]     Ovarian cancer Mother Alma [place]     Heart attack Father Caden [place]     Coronary artery disease Father Caden [place]     Other (cardiac disorder) Father Caden [place]     Diabetes Father Caden [place]     Heart disease Father Caden [place]     Heart attack Brother Romain [place]     Coronary artery disease Brother Romain [place]     Crohn's disease Brother Romain [place]     Other (cardiac disorder) Brother Romain [place]     Heart disease Brother Romain [place]     Colon cancer Paternal Grandmother Laureen Bay     Stroke Paternal Grandfather Romain [place]       Allergies   Allergen Reactions    Amoxicillin-Pot Clavulanate Unknown    Beta-Blockers (Beta-Adrenergic Blocking Agts) Unknown    Furosemide Hives    Metoprolol Other    Procaine Other    Proparacaine Unknown    Sulfa (Sulfonamide Antibiotics) Hives      Current Outpatient Medications   Medication Instructions    albuterol 108 (90 Base) MCG/ACT inhaler Inhale.    aspirin 81 mg capsule 1 tablet, Daily    cholecalciferol (VITAMIN D-3) 25 mcg, oral, Daily    coenzyme Q-10 10 mg, 2 times daily    DILT- mg, oral, Daily    ketoconazole (NIZOral) 2 % cream Apply topically in a thin layer daily when flaring to affected skin under the breasts    nitroglycerin (NITROSTAT) 0.4 mg, Every 5 min PRN     "rosuvastatin (CRESTOR) 10 mg, oral, Daily (0630)    sertraline (ZOLOFT) 50 mg, oral, Daily    solifenacin (VESICARE) 10 mg, oral, Daily, Swallow tablet whole; do not crush, chew, or split.    ZyrTEC 10 mg, Daily PRN          10/31/2023     8:34 AM 8/5/2024     1:06 PM 9/23/2024    12:52 PM 11/19/2024     2:29 PM 6/19/2025     3:03 PM 8/5/2025     1:42 PM 8/5/2025     2:07 PM   Vitals   Systolic 122 124 119  162 103 125   Diastolic 66 66 66  82 69 79   BP Location  Left arm    Left arm    Heart Rate 94 77 82  79 86    Temp     36.5 °C (97.7 °F)     Height  1.549 m (5' 1\") 1.549 m (5' 1\") 1.549 m (5' 1\") 1.549 m (5' 1\") 1.549 m (5' 1\")    Weight (lb) 182 185.13 185 185 183 179.06    BMI 34.39 kg/m2 34.98 kg/m2 34.96 kg/m2 34.96 kg/m2 34.58 kg/m2 33.83 kg/m2    BSA (m2) 1.89 m2 1.9 m2 1.9 m2 1.9 m2 1.89 m2 1.87 m2    Visit Report Report Report Report  Report Report Report       Physical Exam  Constitutional:       Appearance: Normal appearance.   HENT:      Head: Normocephalic and atraumatic.      Nose: Nose normal.   Neck:      Vascular: No carotid bruit.     Cardiovascular:      Rate and Rhythm: Normal rate and regular rhythm.      Heart sounds: No murmur heard.  Pulmonary:      Effort: Pulmonary effort is normal.      Breath sounds: Normal breath sounds.   Abdominal:      Palpations: Abdomen is soft.      Tenderness: There is no abdominal tenderness.     Musculoskeletal:      Right lower leg: No edema.      Left lower leg: No edema.     Skin:     General: Skin is warm and dry.     Neurological:      General: No focal deficit present.      Mental Status: She is alert.     Psychiatric:         Mood and Affect: Mood normal.         Judgment: Judgment normal.        Labs  9/24 Cr 0.93, K 4.1, LDL 93, HDL 59, TG 83, Chol 169, HGB 14.4, , hgba1c 5.3, TSH 4.99    Assessment/Plan   80 yo WF w/ h/o CAD s/p CABG 1/13 (LIMA->LAD, SVG->OM, SVG->RCA), PAD, HTN, HLD, parox SVT, KUSH (intol CPAP) now w/ dizziness with head " movement c/w BPPV. She has upcoming appt with ENT.  Long h/o atypical CP most c/w GERD as evidence by resolution with antacid. Nuc 9/21 with no ischemia. If CP more typical (lucho if doesn't resolve w/ antacid), can get updated stress test.  -continue ASA 81 qd (with food)  -continue Diltiazem 120 qd (depression on BB)  -continue Rosuva 10 qd -> goal LDL <70  -f/u 1 year (earlier if needed)     Kishore Rose MD

## 2025-08-08 LAB
ATRIAL RATE: 86 BPM
P AXIS: 58 DEGREES
P OFFSET: 195 MS
P ONSET: 141 MS
PR INTERVAL: 144 MS
Q ONSET: 213 MS
QRS COUNT: 15 BEATS
QRS DURATION: 82 MS
QT INTERVAL: 386 MS
QTC CALCULATION(BAZETT): 461 MS
QTC FREDERICIA: 435 MS
R AXIS: 3 DEGREES
T AXIS: 169 DEGREES
T OFFSET: 406 MS
VENTRICULAR RATE: 86 BPM

## 2025-08-14 ENCOUNTER — ANCILLARY PROCEDURE (OUTPATIENT)
Dept: URGENT CARE | Age: 80
End: 2025-08-14
Payer: MEDICARE

## 2025-08-14 ENCOUNTER — OFFICE VISIT (OUTPATIENT)
Dept: URGENT CARE | Age: 80
End: 2025-08-14
Payer: MEDICARE

## 2025-08-14 VITALS
TEMPERATURE: 98 F | HEART RATE: 74 BPM | HEIGHT: 61 IN | RESPIRATION RATE: 20 BRPM | BODY MASS INDEX: 33.99 KG/M2 | SYSTOLIC BLOOD PRESSURE: 135 MMHG | WEIGHT: 180 LBS | OXYGEN SATURATION: 99 % | DIASTOLIC BLOOD PRESSURE: 85 MMHG
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
## 2025-08-14 DIAGNOSIS — S82.65XA CLOSED NONDISPLACED FRACTURE OF LATERAL MALLEOLUS OF LEFT FIBULA, INITIAL ENCOUNTER: Primary | ICD-10-CM

## 2025-08-14 DIAGNOSIS — S83.91XA SPRAIN OF RIGHT KNEE, UNSPECIFIED LIGAMENT, INITIAL ENCOUNTER: ICD-10-CM

## 2025-08-14 PROCEDURE — 73630 X-RAY EXAM OF FOOT: CPT | Mod: LEFT SIDE | Performed by: PHYSICIAN ASSISTANT

## 2025-08-14 PROCEDURE — 73564 X-RAY EXAM KNEE 4 OR MORE: CPT | Mod: RIGHT SIDE | Performed by: PHYSICIAN ASSISTANT

## 2025-08-14 PROCEDURE — 73610 X-RAY EXAM OF ANKLE: CPT | Mod: LEFT SIDE | Performed by: PHYSICIAN ASSISTANT

## 2025-08-14 ASSESSMENT — ENCOUNTER SYMPTOMS
DEPRESSION: 0
LOSS OF SENSATION IN FEET: 0
FEVER: 0
DIAPHORESIS: 0
ARTHRALGIAS: 1
JOINT SWELLING: 1
OCCASIONAL FEELINGS OF UNSTEADINESS: 0
CHILLS: 0

## 2025-08-23 ENCOUNTER — ANCILLARY PROCEDURE (OUTPATIENT)
Dept: URGENT CARE | Age: 80
End: 2025-08-23
Payer: MEDICARE

## 2025-08-23 ENCOUNTER — OFFICE VISIT (OUTPATIENT)
Dept: URGENT CARE | Age: 80
End: 2025-08-23
Payer: MEDICARE

## 2025-08-23 VITALS
HEIGHT: 61 IN | RESPIRATION RATE: 16 BRPM | SYSTOLIC BLOOD PRESSURE: 118 MMHG | WEIGHT: 180 LBS | HEART RATE: 86 BPM | BODY MASS INDEX: 33.99 KG/M2 | OXYGEN SATURATION: 94 % | DIASTOLIC BLOOD PRESSURE: 58 MMHG

## 2025-08-23 DIAGNOSIS — S92.355D CLOSED NONDISPLACED FRACTURE OF FIFTH METATARSAL BONE OF LEFT FOOT WITH ROUTINE HEALING, SUBSEQUENT ENCOUNTER: Primary | ICD-10-CM

## 2025-08-23 DIAGNOSIS — M25.572 LEFT ANKLE PAIN, UNSPECIFIED CHRONICITY: ICD-10-CM

## 2025-08-23 DIAGNOSIS — S82.65XD CLOSED NONDISPLACED FRACTURE OF LATERAL MALLEOLUS OF LEFT FIBULA WITH ROUTINE HEALING, SUBSEQUENT ENCOUNTER: ICD-10-CM

## 2025-08-23 PROCEDURE — 73610 X-RAY EXAM OF ANKLE: CPT | Mod: LEFT SIDE | Performed by: PHYSICIAN ASSISTANT

## 2025-08-23 PROCEDURE — 99213 OFFICE O/P EST LOW 20 MIN: CPT | Performed by: PHYSICIAN ASSISTANT

## 2025-08-23 PROCEDURE — 1125F AMNT PAIN NOTED PAIN PRSNT: CPT | Performed by: PHYSICIAN ASSISTANT

## 2025-08-23 PROCEDURE — 3074F SYST BP LT 130 MM HG: CPT | Performed by: PHYSICIAN ASSISTANT

## 2025-08-23 PROCEDURE — 3078F DIAST BP <80 MM HG: CPT | Performed by: PHYSICIAN ASSISTANT

## 2025-08-23 PROCEDURE — 1159F MED LIST DOCD IN RCRD: CPT | Performed by: PHYSICIAN ASSISTANT

## 2025-08-23 ASSESSMENT — PAIN SCALES - GENERAL: PAINLEVEL_OUTOF10: 7

## 2025-08-26 ENCOUNTER — APPOINTMENT (OUTPATIENT)
Dept: AUDIOLOGY | Facility: CLINIC | Age: 80
End: 2025-08-26
Payer: MEDICARE

## 2025-08-26 ENCOUNTER — APPOINTMENT (OUTPATIENT)
Dept: OTOLARYNGOLOGY | Facility: CLINIC | Age: 80
End: 2025-08-26
Payer: MEDICARE

## 2025-09-23 ENCOUNTER — APPOINTMENT (OUTPATIENT)
Dept: BEHAVIORAL HEALTH | Facility: CLINIC | Age: 80
End: 2025-09-23
Payer: MEDICARE

## 2025-11-05 ENCOUNTER — APPOINTMENT (OUTPATIENT)
Dept: DERMATOLOGY | Facility: CLINIC | Age: 80
End: 2025-11-05
Payer: MEDICARE

## 2026-06-25 ENCOUNTER — APPOINTMENT (OUTPATIENT)
Dept: UROLOGY | Facility: CLINIC | Age: 81
End: 2026-06-25
Payer: MEDICARE